# Patient Record
Sex: MALE | Race: WHITE | NOT HISPANIC OR LATINO | Employment: FULL TIME | URBAN - NONMETROPOLITAN AREA
[De-identification: names, ages, dates, MRNs, and addresses within clinical notes are randomized per-mention and may not be internally consistent; named-entity substitution may affect disease eponyms.]

---

## 2018-03-08 ENCOUNTER — TRANSCRIBE ORDERS (OUTPATIENT)
Dept: ADMINISTRATIVE | Facility: HOSPITAL | Age: 38
End: 2018-03-08

## 2020-12-01 ENCOUNTER — OFFICE VISIT (OUTPATIENT)
Dept: ENDOCRINOLOGY | Facility: CLINIC | Age: 40
End: 2020-12-01

## 2020-12-01 VITALS
SYSTOLIC BLOOD PRESSURE: 100 MMHG | HEART RATE: 64 BPM | WEIGHT: 282.4 LBS | DIASTOLIC BLOOD PRESSURE: 64 MMHG | BODY MASS INDEX: 38.25 KG/M2 | HEIGHT: 72 IN

## 2020-12-01 DIAGNOSIS — I10 BENIGN HYPERTENSION: ICD-10-CM

## 2020-12-01 DIAGNOSIS — E11.65 UNCONTROLLED TYPE 2 DIABETES MELLITUS WITH HYPERGLYCEMIA (HCC): Primary | ICD-10-CM

## 2020-12-01 DIAGNOSIS — E78.2 MIXED HYPERLIPIDEMIA: ICD-10-CM

## 2020-12-01 DIAGNOSIS — E11.42 TYPE 2 DIABETES MELLITUS WITH DIABETIC POLYNEUROPATHY, WITH LONG-TERM CURRENT USE OF INSULIN (HCC): ICD-10-CM

## 2020-12-01 DIAGNOSIS — Z79.4 TYPE 2 DIABETES MELLITUS WITH DIABETIC POLYNEUROPATHY, WITH LONG-TERM CURRENT USE OF INSULIN (HCC): ICD-10-CM

## 2020-12-01 PROBLEM — E11.49 TYPE 2 DIABETES MELLITUS WITH NEUROLOGIC COMPLICATION, WITH LONG-TERM CURRENT USE OF INSULIN (HCC): Status: ACTIVE | Noted: 2020-12-01

## 2020-12-01 LAB
EXPIRATION DATE: NORMAL
HBA1C MFR BLD: 6.7 %
Lab: NORMAL

## 2020-12-01 PROCEDURE — 99213 OFFICE O/P EST LOW 20 MIN: CPT | Performed by: INTERNAL MEDICINE

## 2020-12-01 PROCEDURE — 83036 HEMOGLOBIN GLYCOSYLATED A1C: CPT | Performed by: INTERNAL MEDICINE

## 2020-12-01 RX ORDER — INSULIN GLARGINE 300 U/ML
50 INJECTION, SOLUTION SUBCUTANEOUS DAILY
COMMUNITY
End: 2022-02-17 | Stop reason: SDUPTHER

## 2020-12-01 RX ORDER — DULAGLUTIDE 1.5 MG/.5ML
INJECTION, SOLUTION SUBCUTANEOUS WEEKLY
COMMUNITY
End: 2021-07-09 | Stop reason: DRUGHIGH

## 2020-12-01 RX ORDER — OMEPRAZOLE 20 MG/1
40 CAPSULE, DELAYED RELEASE ORAL DAILY
COMMUNITY

## 2020-12-01 RX ORDER — SIMVASTATIN 10 MG
20 TABLET ORAL NIGHTLY
COMMUNITY
End: 2022-07-19

## 2020-12-01 RX ORDER — ERGOCALCIFEROL 1.25 MG/1
50000 CAPSULE ORAL WEEKLY
COMMUNITY

## 2020-12-01 RX ORDER — LISINOPRIL 20 MG/1
40 TABLET ORAL DAILY
COMMUNITY

## 2020-12-01 RX ORDER — DAPAGLIFLOZIN AND METFORMIN HYDROCHLORIDE 5; 1000 MG/1; MG/1
1 TABLET, FILM COATED, EXTENDED RELEASE ORAL 2 TIMES DAILY
COMMUNITY
End: 2022-02-17 | Stop reason: SDUPTHER

## 2020-12-01 RX ORDER — DULOXETIN HYDROCHLORIDE 60 MG/1
60 CAPSULE, DELAYED RELEASE ORAL DAILY
COMMUNITY

## 2020-12-01 NOTE — PROGRESS NOTES
"     Office Note      Date: 2020  Patient Name: Inocencio Hicks  MRN: [id number]  : 1980    Chief Complaint   Patient presents with   • Diabetes       History of Present Illness:   Inocencio Hicks is a 40 y.o. male who presents for Diabetes type 2. Diagnosed in: . Treated in past with oral agents. Current treatments: farxiga, metformin, trulicity and basal insulin. Number of insulin shots per day: 1. Checks blood sugar none - on FreeStyle Gloria. Has low blood sugar: occasional. Aspirin use: No - no indication. Statin use: Yes. ACE-I/ARB use: Yes. Changes in health since last visit: none. Last eye exam .    Subjective      Diabetic Complications:  Eyes: No  Kidneys: No  Feet: Yes - on cymbalta  Heart: No    Diet and Exercise:  Meals per day: 3  Minutes of exercise per week: 0 mins.    Review of Systems:   Review of Systems   Constitutional: Negative.    Cardiovascular: Negative.    Gastrointestinal: Negative.    Endocrine: Negative.        The following portions of the patient's history were reviewed and updated as appropriate: allergies, current medications, past family history, past medical history, past social history, past surgical history and problem list.    Objective       Visit Vitals  /64 (BP Location: Right arm, Patient Position: Sitting, Cuff Size: Adult)   Pulse 64   Ht 182.9 cm (72\")   Wt 128 kg (282 lb 6.4 oz)   BMI 38.30 kg/m²       Physical Exam:  Physical Exam  Constitutional:       Appearance: Normal appearance.   Neurological:      Mental Status: He is alert.         Labs:    HbA1c  Lab Results   Component Value Date    HGBA1C 6.7 2020       CMP  No results found for: GLUCOSE, BUN, CREATININE, EGFRIFNONA, EGFRIFAFRI, BCR, K, CO2, CALCIUM, PROTENTOTREF, LABIL2, BILIRUBIN, AST, ALT     Lipid Panel        TSH  No results found for: TSH, FREET4     Hemoglobin A1C  Lab Results   Component Value Date    HGBA1C 6.7 2020        Microalbumin/Creatinine  No results " found for: MALBCRERATIO, CREATINIURIN, MICROALBUR        Assessment / Plan      Assessment & Plan:  Problem List Items Addressed This Visit        Cardiovascular and Mediastinum    Benign hypertension    Current Assessment & Plan     Hypertension is unchanged.  Continue current treatment regimen.  Blood pressure will be reassessed in 3 months.         Relevant Medications    lisinopril (PRINIVIL,ZESTRIL) 10 MG tablet    Mixed hyperlipidemia    Relevant Medications    simvastatin (ZOCOR) 10 MG tablet       Endocrine    Uncontrolled type 2 diabetes mellitus with hyperglycemia (CMS/[facility]) - Primary    Current Assessment & Plan     Diabetes is improving with treatment.   Continue current treatment regimen.  Diabetes will be reassessed in 3 months.         Relevant Medications    Insulin Glargine, 2 Unit Dial, (Toujeo Max SoloStar) 300 UNIT/ML solution pen-injector injection    Dulaglutide (Trulicity) 1.5 MG/0.5ML solution pen-injector    dapagliflozin-metformin HCl ER (Xigduo XR) 5-1000 MG tablet    Other Relevant Orders    POC Glycosylated Hemoglobin (Hb A1C) (Completed)    Type 2 diabetes mellitus with neurologic complication, with long-term current use of insulin (CMS/[facility])    Current Assessment & Plan     Continue duloxetine.         Relevant Medications    Insulin Glargine, 2 Unit Dial, (Toujeo Max SoloStar) 300 UNIT/ML solution pen-injector injection    Dulaglutide (Trulicity) 1.5 MG/0.5ML solution pen-injector    dapagliflozin-metformin HCl ER (Xigduo XR) 5-1000 MG tablet           Return in about 3 months (around 3/1/2021) for Recheck with A1c.    Brice Moura MD   12/01/2020

## 2020-12-02 ENCOUNTER — TELEPHONE (OUTPATIENT)
Dept: ENDOCRINOLOGY | Facility: CLINIC | Age: 40
End: 2020-12-02

## 2021-07-09 ENCOUNTER — OFFICE VISIT (OUTPATIENT)
Dept: ENDOCRINOLOGY | Facility: CLINIC | Age: 41
End: 2021-07-09

## 2021-07-09 VITALS
DIASTOLIC BLOOD PRESSURE: 60 MMHG | HEIGHT: 72 IN | BODY MASS INDEX: 38.82 KG/M2 | OXYGEN SATURATION: 95 % | SYSTOLIC BLOOD PRESSURE: 100 MMHG | HEART RATE: 71 BPM | WEIGHT: 286.6 LBS

## 2021-07-09 DIAGNOSIS — I10 BENIGN HYPERTENSION: ICD-10-CM

## 2021-07-09 DIAGNOSIS — E11.65 UNCONTROLLED TYPE 2 DIABETES MELLITUS WITH HYPERGLYCEMIA (HCC): Primary | ICD-10-CM

## 2021-07-09 DIAGNOSIS — E78.2 MIXED HYPERLIPIDEMIA: ICD-10-CM

## 2021-07-09 DIAGNOSIS — E11.42 TYPE 2 DIABETES MELLITUS WITH DIABETIC POLYNEUROPATHY, WITH LONG-TERM CURRENT USE OF INSULIN (HCC): ICD-10-CM

## 2021-07-09 DIAGNOSIS — Z79.4 TYPE 2 DIABETES MELLITUS WITH DIABETIC POLYNEUROPATHY, WITH LONG-TERM CURRENT USE OF INSULIN (HCC): ICD-10-CM

## 2021-07-09 LAB — HBA1C MFR BLD: 6.9 %

## 2021-07-09 PROCEDURE — 99214 OFFICE O/P EST MOD 30 MIN: CPT | Performed by: INTERNAL MEDICINE

## 2021-07-09 RX ORDER — SUMATRIPTAN 100 MG/1
100 TABLET, FILM COATED ORAL AS NEEDED
COMMUNITY

## 2021-07-09 RX ORDER — FEXOFENADINE HCL 180 MG/1
180 TABLET ORAL DAILY
COMMUNITY

## 2021-07-09 RX ORDER — CELECOXIB 200 MG/1
200 CAPSULE ORAL DAILY
COMMUNITY

## 2021-07-09 RX ORDER — DULAGLUTIDE 3 MG/.5ML
3 INJECTION, SOLUTION SUBCUTANEOUS
Qty: 6 ML | Refills: 3 | Status: SHIPPED | OUTPATIENT
Start: 2021-07-09 | End: 2022-02-17 | Stop reason: SDUPTHER

## 2021-07-09 RX ORDER — ALBUTEROL SULFATE 90 UG/1
2 AEROSOL, METERED RESPIRATORY (INHALATION) AS NEEDED
COMMUNITY

## 2021-07-09 NOTE — ASSESSMENT & PLAN NOTE
Diabetes is unchanged.   Continue current treatment regimen.  We discussed increase trulicity to try to help with weight.  Diabetes will be reassessed in 3 months.

## 2021-07-09 NOTE — PROGRESS NOTES
"     Office Note      Date: 2021  Patient Name: Inocencio Hicks  MRN: [id number]  : 1980    Chief Complaint   Patient presents with   • Follow-up   • Diabetes     type2   • Diabetic Eye Exam     2020       History of Present Illness:   Inocencio Hikcs is a 41 y.o. male who presents for Diabetes type 2. Diagnosed in: . Treated in past with oral agents. Current treatments: farxiga, metformin, trulicity and basal insulin. Number of insulin shots per day: 1. Checks blood sugar none - on FreeStyle Gloria. Has low blood sugar: occasional. Aspirin use: No - no indication. Statin use: Yes. ACE-I/ARB use: Yes. Changes in health since last visit: carpal tunnel and Dupuytren's contracture release. Last eye exam .    Subjective      Diabetic Complications:  Eyes: No  Kidneys: No  Feet: Yes - on cymbalta  Heart: No    Diet and Exercise:  Meals per day: 3  Minutes of exercise per week: 0 mins.    Review of Systems:   Review of Systems   Constitutional: Negative.    Cardiovascular: Negative.    Gastrointestinal: Negative.    Endocrine: Negative.        The following portions of the patient's history were reviewed and updated as appropriate: allergies, current medications, past family history, past medical history, past social history, past surgical history and problem list.    Objective       Visit Vitals  /60   Pulse 71   Ht 182.9 cm (72\")   Wt 130 kg (286 lb 9.6 oz)   SpO2 95%   BMI 38.87 kg/m²       Physical Exam:  Physical Exam  Constitutional:       Appearance: Normal appearance.   Neurological:      Mental Status: He is alert.         Labs:    HbA1c  Lab Results   Component Value Date    HGBA1C 6.9 2021       CMP  No results found for: GLUCOSE, BUN, CREATININE, EGFRIFNONA, EGFRIFAFRI, BCR, K, CO2, CALCIUM, PROTENTOTREF, LABIL2, BILIRUBIN, AST, ALT     Lipid Panel        TSH  No results found for: TSH, FREET4     Hemoglobin A1C  Lab Results   Component Value Date    HGBA1C 6.9 " 05/11/2021        Microalbumin/Creatinine  No results found for: MALBCRERATIO, CREATINIURIN, MICROALBUR        Assessment / Plan      Assessment & Plan:  Diagnoses and all orders for this visit:    1. Uncontrolled type 2 diabetes mellitus with hyperglycemia (CMS/[facility]) (Primary)  Assessment & Plan:  Diabetes is unchanged.   Continue current treatment regimen.  We discussed increase trulicity to try to help with weight.  Diabetes will be reassessed in 3 months.      2. Benign hypertension  Assessment & Plan:  Hypertension is unchanged.  Continue current treatment regimen.  Blood pressure will be reassessed at the next regular appointment.      3. Mixed hyperlipidemia  Assessment & Plan:  Continue statin.  Recent lipids at goal.      4. Type 2 diabetes mellitus with diabetic polyneuropathy, with long-term current use of insulin (CMS/[facility])  Assessment & Plan:  Continue cymbalta.      Other orders  -     Dulaglutide (Trulicity) 3 MG/0.5ML solution pen-injector; Inject 3 mg under the skin into the appropriate area as directed Every 7 (Seven) Days.  Dispense: 6 mL; Refill: 3      Return in about 3 months (around 10/9/2021) for Recheck with A1c, TSH.    Brice Moura MD   07/09/2021

## 2021-10-28 ENCOUNTER — OFFICE VISIT (OUTPATIENT)
Dept: ENDOCRINOLOGY | Facility: CLINIC | Age: 41
End: 2021-10-28

## 2021-10-28 VITALS
OXYGEN SATURATION: 95 % | BODY MASS INDEX: 37.93 KG/M2 | HEIGHT: 72 IN | DIASTOLIC BLOOD PRESSURE: 70 MMHG | HEART RATE: 78 BPM | SYSTOLIC BLOOD PRESSURE: 134 MMHG | WEIGHT: 280 LBS

## 2021-10-28 DIAGNOSIS — E78.2 MIXED HYPERLIPIDEMIA: ICD-10-CM

## 2021-10-28 DIAGNOSIS — E11.65 UNCONTROLLED TYPE 2 DIABETES MELLITUS WITH HYPERGLYCEMIA (HCC): Primary | ICD-10-CM

## 2021-10-28 DIAGNOSIS — I10 BENIGN HYPERTENSION: ICD-10-CM

## 2021-10-28 DIAGNOSIS — E11.65 UNCONTROLLED TYPE 2 DIABETES MELLITUS WITH HYPERGLYCEMIA (HCC): ICD-10-CM

## 2021-10-28 LAB
EXPIRATION DATE: NORMAL
EXPIRATION DATE: NORMAL
GLUCOSE BLDC GLUCOMTR-MCNC: 121 MG/DL (ref 70–130)
HBA1C MFR BLD: 6.2 %
Lab: NORMAL
Lab: NORMAL
TSH SERPL DL<=0.05 MIU/L-ACNC: 1.18 UIU/ML (ref 0.27–4.2)

## 2021-10-28 PROCEDURE — 95251 CONT GLUC MNTR ANALYSIS I&R: CPT | Performed by: INTERNAL MEDICINE

## 2021-10-28 PROCEDURE — 83036 HEMOGLOBIN GLYCOSYLATED A1C: CPT | Performed by: INTERNAL MEDICINE

## 2021-10-28 PROCEDURE — 84443 ASSAY THYROID STIM HORMONE: CPT | Performed by: INTERNAL MEDICINE

## 2021-10-28 PROCEDURE — 99214 OFFICE O/P EST MOD 30 MIN: CPT | Performed by: INTERNAL MEDICINE

## 2021-10-28 NOTE — PROGRESS NOTES
"     Office Note      Date: 10/28/2021  Patient Name: Inocencio Hicks  MRN: [id number]  : 1980    Chief Complaint   Patient presents with   • Diabetes       History of Present Illness:   Inocencio Hicks is a 41 y.o. male who presents for Diabetes type 2. Diagnosed in: . Treated in past with oral agents. Current treatments: farxiga, metformin, trulicity and basal insulin. Number of insulin shots per day: 1. Checks blood sugar 288x per day - on FreeStyle Gloria. Has low blood sugar: occasional. Aspirin use: No - no indication. Statin use: Yes. ACE-I/ARB use: Yes. Changes in health since last visit: COVID-19 positive and sinus infection. Last eye exam .    Subjective      Diabetic Complications:  Eyes: No  Kidneys: No  Feet: Yes - on cymbalta  Heart: No    Diet and Exercise:  Meals per day: 3  Minutes of exercise per week: 0 mins.    Review of Systems:   Review of Systems   Constitutional: Negative.    Cardiovascular: Negative.    Gastrointestinal: Negative.    Endocrine: Negative.        The following portions of the patient's history were reviewed and updated as appropriate: allergies, current medications, past family history, past medical history, past social history, past surgical history and problem list.    Objective       Visit Vitals  /70   Pulse 78   Ht 182.9 cm (72\")   Wt 127 kg (280 lb)   SpO2 95%   BMI 37.97 kg/m²       Physical Exam:  Physical Exam  Constitutional:       Appearance: Normal appearance.   Cardiovascular:      Pulses:           Dorsalis pedis pulses are 2+ on the right side and 2+ on the left side.        Posterior tibial pulses are 2+ on the right side and 2+ on the left side.   Feet:      Right foot:      Protective Sensation: 5 sites tested. 5 sites sensed.      Skin integrity: Skin integrity normal.      Toenail Condition: Right toenails are abnormally thick.      Left foot:      Protective Sensation: 5 sites tested. 5 sites sensed.      Skin integrity: Skin " integrity normal.      Toenail Condition: Left toenails are abnormally thick.   Neurological:      Mental Status: He is alert.         Labs:    HbA1c  Lab Results   Component Value Date    HGBA1C 6.2 10/28/2021       CMP  No results found for: GLUCOSE, BUN, CREATININE, EGFRIFNONA, EGFRIFAFRI, BCR, K, CO2, CALCIUM, PROTENTOTREF, LABIL2, BILIRUBIN, AST, ALT     Lipid Panel        TSH  No results found for: TSH, FREET4     Hemoglobin A1C  Lab Results   Component Value Date    HGBA1C 6.2 10/28/2021        Microalbumin/Creatinine  No results found for: MALBCRERATIO, CREATINIURIN, MICROALBUR        Assessment / Plan      Assessment & Plan:  Diagnoses and all orders for this visit:    1. Uncontrolled type 2 diabetes mellitus with hyperglycemia (HCC) (Primary)  Assessment & Plan:  Diabetes is improving with treatment.  A1c looks good at 6.2%.  Continue current treatment regimen.  Diabetes will be reassessed in 3 months.    FreeStyle Gloria download today shows no patterns for medication adjustments.  Worked on setting this up for sharing today.    Orders:  -     POC Glycosylated Hemoglobin (Hb A1C)  -     POC Glucose, Blood  -     TSH; Future    2. Benign hypertension  Assessment & Plan:  Hypertension is unchanged.  Continue current treatment regimen.  Blood pressure will be reassessed at the next regular appointment.      3. Mixed hyperlipidemia  Assessment & Plan:  Continue statin.        Return in about 3 months (around 1/28/2022) for Recheck with A1c.    Brice Moura MD   10/28/2021

## 2021-10-28 NOTE — ASSESSMENT & PLAN NOTE
Diabetes is improving with treatment.  A1c looks good at 6.2%.  Continue current treatment regimen.  Diabetes will be reassessed in 3 months.    FreeStyle Gloria download today shows no patterns for medication adjustments.  Worked on setting this up for sharing today.

## 2022-02-10 ENCOUNTER — DOCUMENTATION (OUTPATIENT)
Dept: ENDOCRINOLOGY | Facility: CLINIC | Age: 42
End: 2022-02-10

## 2022-02-17 ENCOUNTER — SPECIALTY PHARMACY (OUTPATIENT)
Dept: ENDOCRINOLOGY | Facility: CLINIC | Age: 42
End: 2022-02-17

## 2022-02-17 ENCOUNTER — OFFICE VISIT (OUTPATIENT)
Dept: ENDOCRINOLOGY | Facility: CLINIC | Age: 42
End: 2022-02-17

## 2022-02-17 VITALS
DIASTOLIC BLOOD PRESSURE: 80 MMHG | SYSTOLIC BLOOD PRESSURE: 130 MMHG | HEIGHT: 72 IN | BODY MASS INDEX: 38.6 KG/M2 | OXYGEN SATURATION: 96 % | WEIGHT: 285 LBS | HEART RATE: 76 BPM

## 2022-02-17 DIAGNOSIS — Z79.4 TYPE 2 DIABETES MELLITUS WITH DIABETIC POLYNEUROPATHY, WITH LONG-TERM CURRENT USE OF INSULIN: ICD-10-CM

## 2022-02-17 DIAGNOSIS — I10 BENIGN HYPERTENSION: ICD-10-CM

## 2022-02-17 DIAGNOSIS — E11.65 UNCONTROLLED TYPE 2 DIABETES MELLITUS WITH HYPERGLYCEMIA: Primary | ICD-10-CM

## 2022-02-17 DIAGNOSIS — E11.42 TYPE 2 DIABETES MELLITUS WITH DIABETIC POLYNEUROPATHY, WITH LONG-TERM CURRENT USE OF INSULIN: ICD-10-CM

## 2022-02-17 DIAGNOSIS — E78.2 MIXED HYPERLIPIDEMIA: ICD-10-CM

## 2022-02-17 LAB
EXPIRATION DATE: ABNORMAL
EXPIRATION DATE: ABNORMAL
GLUCOSE BLDC GLUCOMTR-MCNC: 148 MG/DL (ref 70–130)
HBA1C MFR BLD: 6.4 %
Lab: ABNORMAL
Lab: ABNORMAL

## 2022-02-17 PROCEDURE — 83036 HEMOGLOBIN GLYCOSYLATED A1C: CPT | Performed by: INTERNAL MEDICINE

## 2022-02-17 PROCEDURE — 95251 CONT GLUC MNTR ANALYSIS I&R: CPT | Performed by: INTERNAL MEDICINE

## 2022-02-17 PROCEDURE — 99214 OFFICE O/P EST MOD 30 MIN: CPT | Performed by: INTERNAL MEDICINE

## 2022-02-17 RX ORDER — FLURBIPROFEN SODIUM 0.3 MG/ML
SOLUTION/ DROPS OPHTHALMIC
Qty: 100 EACH | Refills: 3 | Status: SHIPPED | OUTPATIENT
Start: 2022-02-17

## 2022-02-17 RX ORDER — DAPAGLIFLOZIN AND METFORMIN HYDROCHLORIDE 5; 1000 MG/1; MG/1
2 TABLET, FILM COATED, EXTENDED RELEASE ORAL DAILY
Qty: 60 TABLET | Refills: 11 | Status: SHIPPED | OUTPATIENT
Start: 2022-02-17 | End: 2023-01-04 | Stop reason: SDUPTHER

## 2022-02-17 RX ORDER — FLASH GLUCOSE SENSOR
1 KIT MISCELLANEOUS
Qty: 2 EACH | Refills: 11 | Status: SHIPPED | OUTPATIENT
Start: 2022-02-17 | End: 2022-09-13

## 2022-02-17 RX ORDER — FLASH GLUCOSE SENSOR
KIT MISCELLANEOUS
COMMUNITY
Start: 2021-12-23 | End: 2022-02-17 | Stop reason: SDUPTHER

## 2022-02-17 RX ORDER — DULAGLUTIDE 3 MG/.5ML
3 INJECTION, SOLUTION SUBCUTANEOUS
Qty: 2 ML | Refills: 11 | Status: SHIPPED | OUTPATIENT
Start: 2022-02-17 | End: 2023-01-04 | Stop reason: SDUPTHER

## 2022-02-17 RX ORDER — DAPAGLIFLOZIN AND METFORMIN HYDROCHLORIDE 5; 1000 MG/1; MG/1
2 TABLET, FILM COATED, EXTENDED RELEASE ORAL DAILY
Qty: 30 TABLET | Refills: 11 | Status: SHIPPED | OUTPATIENT
Start: 2022-02-17 | End: 2022-02-17 | Stop reason: SDUPTHER

## 2022-02-17 RX ORDER — INSULIN GLARGINE 300 U/ML
50 INJECTION, SOLUTION SUBCUTANEOUS DAILY
Qty: 6 ML | Refills: 11 | Status: SHIPPED | OUTPATIENT
Start: 2022-02-17 | End: 2022-09-13 | Stop reason: SDUPTHER

## 2022-02-17 NOTE — ASSESSMENT & PLAN NOTE
Diabetes is unchanged.  A1c okay at 6.4%.  CGM shows some spikes after supper.  He hasn't been as good about diet.    Continue current treatment regimen.  Work on diet/exercise.  Diabetes will be reassessed in 3 months.

## 2022-02-17 NOTE — PROGRESS NOTES
"     Office Note      Date: 2022  Patient Name: Inocencio Hicks  MRN: [id number]  : 1980    Chief Complaint   Patient presents with   • Diabetes       History of Present Illness:   Inocencio Hicks is a 42 y.o. male who presents for Diabetes type 2. Diagnosed in: . Treated in past with oral agents. Current treatments: farxiga, metformin, trulicity and basal insulin. Number of insulin shots per day: 1. Checks blood sugar 288x per day - on FreeStyle Gloria. Has low blood sugar: occasional. Aspirin use: No - no indication. Statin use: Yes. ACE-I/ARB use: Yes. Changes in health since last visit: none. Last eye exam .    Subjective      Diabetic Complications:  Eyes: No  Kidneys: No  Feet: Yes - on cymbalta  Heart: No    Diet and Exercise:  Meals per day: 3  Minutes of exercise per week: 0 mins.    Review of Systems:   Review of Systems   Constitutional: Negative.    Cardiovascular: Negative.    Gastrointestinal: Negative.    Endocrine: Negative.        The following portions of the patient's history were reviewed and updated as appropriate: allergies, current medications, past family history, past medical history, past social history, past surgical history and problem list.    Objective       Visit Vitals  /80   Pulse 76   Ht 182.9 cm (72\")   Wt 129 kg (285 lb)   SpO2 96%   BMI 38.65 kg/m²       Physical Exam:  Physical Exam  Constitutional:       Appearance: Normal appearance.   Neurological:      Mental Status: He is alert.         Labs:    HbA1c  Lab Results   Component Value Date    HGBA1C 6.4 2022       CMP  No results found for: GLUCOSE, BUN, CREATININE, EGFRIFNONA, EGFRIFAFRI, BCR, K, CO2, CALCIUM, PROTENTOTREF, LABIL2, BILIRUBIN, AST, ALT     Lipid Panel        TSH  Lab Results   Component Value Date    TSH 1.180 10/28/2021        Hemoglobin A1C  Lab Results   Component Value Date    HGBA1C 6.4 2022        Microalbumin/Creatinine  No results found for: AVNI" CREATINIURIN, MICROALBUR        Assessment / Plan      Assessment & Plan:  Diagnoses and all orders for this visit:    1. Uncontrolled type 2 diabetes mellitus with hyperglycemia (HCC) (Primary)  Assessment & Plan:  Diabetes is unchanged.  A1c okay at 6.4%.  CGM shows some spikes after supper.  He hasn't been as good about diet.    Continue current treatment regimen.  Work on diet/exercise.  Diabetes will be reassessed in 3 months.    Orders:  -     POC Glycosylated Hemoglobin (Hb A1C)  -     POC Glucose, Blood    2. Type 2 diabetes mellitus with diabetic polyneuropathy, with long-term current use of insulin (HCC)  Assessment & Plan:  Continue duloxetine.      3. Benign hypertension  Assessment & Plan:  Hypertension is unchanged.  Continue current treatment regimen.  Blood pressure will be reassessed at the next regular appointment.      4. Mixed hyperlipidemia  Assessment & Plan:  Continue statin.        Return in about 3 months (around 5/17/2022) for Recheck with A1c, CMP, lipids, TSH, microalbumin, foot exam.    Brice Moura MD   02/17/2022

## 2022-02-17 NOTE — PROGRESS NOTES
Specialty Pharmacy Patient Management Program  Endocrinology Initial Assessment     Inocencio Hicks is a 42 y.o. male with Type 2 Diabetes seen by an Endocrinology provider and enrolled in the Endocrinology Patient Management program offered by [facility].  An initial outreach was conducted, including assessment of therapy appropriateness and specialty medication education for TRULICITY, XIGDUO, TOUJEO MAX. The patient was introduced to services offered by [facility], including: regular assessments, refill coordination, curbside pick-up or mail order delivery options, prior authorization maintenance, and financial assistance programs as applicable. The patient was also provided with contact information for the pharmacy team.     Insurance Coverage & Financial Support  [employer] PPO +  Coupons    Relevant Past Medical History and Comorbidities  Relevant medical history and concomitant health conditions were discussed with the patient. The patient's chart has been reviewed for relevant past medical history and comorbid health conditions and updated as necessary.   Past Medical History:   Diagnosis Date   • Diabetes mellitus (HCC)    • Hyperlipidemia    • Hypertension    • Type 2 diabetes mellitus (HCC)      Social History     Socioeconomic History   • Marital status:    Tobacco Use   • Smoking status: Former Smoker     Quit date: 2010     Years since quittin.2   • Smokeless tobacco: Never Used   Vaping Use   • Vaping Use: Never used   Substance and Sexual Activity   • Alcohol use: Yes     Comment: rare   • Drug use: Not Currently   • Sexual activity: Defer       Problem list reviewed by Yen Henderson RPH on 2022 at  2:31 PM    Allergies  Known allergies and reactions were discussed with the patient. The patient's chart has been reviewed for  allergy information and updated as necessary.   Byetta 10 mcg pen [exenatide] and  Victoza [liraglutide]    Allergies reviewed by Yen Henderson RPH on 2/17/2022 at  2:29 PM  Allergies reviewed by Yen Henderson RPH on 2/17/2022 at  2:30 PM    Current Medication List  This medication list has been reviewed with the patient and evaluated for any interactions or necessary modifications/recommendations, and updated to include all prescription medications, OTC medications, and supplements the patient is currently taking.  This list reflects what is contained in the patient's profile, which has also been marked as reviewed to communicate to other providers it is the most up to date version of the patient's current medication therapy.     Current Outpatient Medications:   •  albuterol sulfate  (90 Base) MCG/ACT inhaler, Inhale 2 puffs As Needed., Disp: , Rfl:   •  celecoxib (CeleBREX) 200 MG capsule, Take 200 mg by mouth Daily., Disp: , Rfl:   •  Continuous Blood Gluc Sensor (FreeStyle Gloria 14 Day Sensor) misc, Change sensor every 14 days, Disp: , Rfl:   •  dapagliflozin-metformin HCl ER (Xigduo XR) 5-1000 MG tablet, Take 1 tablet by mouth 2 (two) times a day., Disp: , Rfl:   •  Dulaglutide (Trulicity) 3 MG/0.5ML solution pen-injector, Inject 3 mg under the skin into the appropriate area as directed Every 7 (Seven) Days., Disp: 6 mL, Rfl: 3  •  DULoxetine (CYMBALTA) 60 MG capsule, Take 60 mg by mouth Daily., Disp: , Rfl:   •  fexofenadine (ALLEGRA) 180 MG tablet, Take 180 mg by mouth Daily., Disp: , Rfl:   •  Insulin Glargine, 2 Unit Dial, (Toujeo Max SoloStar) 300 UNIT/ML solution pen-injector injection, Inject 50 Units under the skin into the appropriate area as directed Daily., Disp: , Rfl:   •  lisinopril (PRINIVIL,ZESTRIL) 10 MG tablet, Take 20 mg by mouth 2 (two) times a day., Disp: , Rfl:   •  omeprazole (priLOSEC) 20 MG capsule, Take 40 mg by mouth Daily., Disp: , Rfl:   •  simvastatin (ZOCOR) 10 MG tablet, Take 20 mg by mouth Every Night., Disp: , Rfl:   •  SUMAtriptan (IMITREX) 100  MG tablet, Take 100 mg by mouth As Needed., Disp: , Rfl:   •  vitamin D (ERGOCALCIFEROL) 1.25 MG (82762 UT) capsule capsule, Take 50,000 Units by mouth 1 (One) Time Per Week., Disp: , Rfl:     Medicines reviewed by Yen Henderson RPH on 2/17/2022 at  2:31 PM    Drug Interactions  No significant DDI identified per medication review     Recommended Medications Assessment  • Aspirin - Not Indicated  • Statin - Currently Taking  • ACEi/ARB - Currently Taking    Relevant Laboratory Values  A1C Last 3 Results    HGBA1C Last 3 Results 5/11/21 10/28/21 2/17/22   Hemoglobin A1C 6.9 6.2 6.4           Lab Results   Component Value Date    HGBA1C 6.4 02/17/2022     No results found for: GLUCOSE, CALCIUM, NA, K, CO2, CL, BUN, CREATININE, EGFRIFAFRI, EGFRIFNONA, BCR, ANIONGAP  No results found for: CHOL, CHLPL, TRIG, HDL, LDL, LDLDIRECT      Initial Education Provided for Specialty Medication  The patient has been provided with the following education and any applicable administration techniques (i.e. self-injection) have been demonstrated for the therapies indicated. All questions and concerns have been addressed prior to the patient receiving the medication, and the patient has verbalized understanding of the education and any materials provided.  Additional patient education shall be provided and documented upon request by the patient, provider or payer.      XIGDUO® XR (dapagliflozin + metformin)   Medication Expectations   Why am I taking this medication? You are taking Xigduo to lower blood sugar because you have type 2 diabetes. Diabetes is not curable but with proper medication and treatment, we can keep your blood sugar within your personalized target range. Farxiga (dapagliflozin), one of the medications in Xigduo, can reduce the risk of progression of kidney disease, reduce the risk of death from heart attack or stroke, and reduce the risk of heart failure hospitalization.    What should I expect while on this  medication? You should expect to see your blood sugar and A1c decrease over time. You may also see a decrease in your blood pressure and it can help some people lose weight.     How does the medication work? Xigduo works by removing some sugar that the body doesn't need through urination, lowering sugar production in the body, reducing the amount of sugar that enters the bloodstream after a meal, and making your body more sensitive to insulin.     How long will I be on this medication for? The amount of time you will be on this medication will be determined by your doctor based on blood sugar and A1c control. You will most likely be on this medication or another diabetes medication throughout your lifetime. Do not abruptly stop this medication without talking to your doctor first.    How do I take this medication? Take as directed on your prescription label, usually once daily in the morning. Swallow tablets whole, do not crush/cut/chew. Take with food.     What are some possible side effects? The most common side effects include urinary tract infections, genital yeast infections, increased urination, diarrhea, gas, nausea and vomiting, stomach pain, indigestion, headache, and stuffy or runny nose and sore throat. Talk with your doctor if you notice white or yellow vaginal discharge, vaginal itching or odor of if you notice redness, itching, pain, or swelling of the penis and/or bad-smelling discharge from the penis.   What happens if I miss a dose? If you miss a dose, take it as soon as you remember. If it is close to your next dose, skip it (do not take 2 doses at once)     Medication Safety   What are things I should warn my doctor immediately about? Tell your doctor if you have kidney disease, liver disease, heart failure, pancreas problems, vitamin B-12 deficiency, or history of frequent genital yeast or urinary tract infections. Tell your doctor if you are on a low-salt diet, if you drink alcohol, or if you  are having surgery. Talk to your doctor if you are pregnant, planning to become pregnant, or breastfeeding. If you have irregular periods or none at all but have not gone through menopause, this medication can cause ovulation and increase the chance of getting pregnant. Also tell your doctor if you notice any signs/symptoms of an allergic reaction (rash, hives, difficulty breathing, etc.).   What are things that I should be cautious of? Be cautious of any side effects from this medication. Talk to your doctor if any new ones develop or aren't getting better.   What are some medications that can interact with this one? This medication can interact with the dye used for an x-ray or CT-scan. Some medications that interact include ranolazine, cimetidine, diuretics (water pills), and other medications that may also lower your blood sugar such as insulins and glipizide/glimepiride/glyburide. Your doctor may reduce the dose of other diabetes medications when you start Xigduo to minimize low blood sugars. Always tell your doctor or pharmacist immediately if you start taking any new medications, including over-the-counter medications, vitamins, and herbal supplements.     Medication Storage/Handling   How should I handle this medication? Keep this medication out of reach of pets/children in tightly sealed container   How does this medication need to be stored? Store at room temperature and keep dry (don't keep in bathroom or other room with moisture)   How should I dispose of this medication? There should not be a need to dispose of this medication unless your provider decides to change the dose or therapy. If that is the case, take to your local police station for proper disposal. Some pharmacies also have take-back bins for medication drop-off.      Resources/Support   How can I remind myself to take this medication? You can download reminder apps to help you manage your refills. You may also set an alarm on your phone to  remind you. The pharmacy carries pill boxes that you can place next to an area you pass everyday (such as where you place your car keys or where you charge your phone)   Is financial support available?  [employer] can provide co-pay cards if you have commercial insurance or patient assistance if you have Medicare or no insurance.    Which vaccines are recommended for me? Talk to your doctor about these vaccines: Flu, Coronavirus (COVID-19), Pneumococcal (pneumonia), Tdap, Hepatitis B, Zoster (shingles)        TRULICITY® (dulaglutide)  Medication Expectations   Why am I taking this medication? You are taking Trulicity, along with diet and exercise, to lower blood sugar because you have type 2 diabetes. Diabetes is not curable but with proper medication and treatment, you can keep your blood sugar within your personalized target range. This medication may also help you lose some weight, and it helps reduce the risk of death from heart attack, and stroke in adults with type 2 diabetes and known heart disease.   What should I expect while on this medication? You should expect to see your blood sugar and A1c decrease over time and you may also lose some weight.   How does the medication work? Trulicity is a non-insulin injection that works with your body's own ability to lower blood sugar and A1c and helps your body release its own insulin in response to your blood sugar rising.  This medication also slows down food from leaving your stomach, making you feel quintero for longer.   How long will I be on this medication for? The amount of time you will be on this medication will be determined by your doctor based on blood sugar and A1c control. You will most likely be on this medication or another diabetes medication throughout your lifetime. Do not abruptly stop this medication without talking to your doctor first.    How do I take this medication? Take as directed on your prescription label. Trulicity is supplied in a  single-use pen for each dose and you will use a new pre-filled pen each week.  It is injected under the skin (subcutaneously) of your stomach, thigh or upper arm.  You may inject in the same body area each week, but make sure to use a different spot each time.  Use this medication once weekly, on the same day each week, with or without food.      First, choose your injection site and clean the area, allowing it to dry completely.  Make sure the pen is in the locked position and remove the cap by pulling it straight off.    • Turn the pen to the unlocked position and place the clear base firmly against your skin at your injection site.  Press and hold the green button; you will hear a click once the injection starts.    • You will hear a second click when the needle starts retracting.  The injection will take about 5-10 seconds.    • Continue pressing against your skin until the gray plunger is visible, and then you will slowly lift the pen from your skin and dispose of the pen (detailed below in “Medication Storage / Handling”).    What are some possible side effects? You may notice you don't feel as hungry, especially when you first start using Trulicity.  In addition to decreased appetite, the most common side effects are nausea, diarrhea, vomiting, stomach pain, indigestion, and fatigue.  Redness, itching, and/or swelling can occur where the shot was given. You should also monitor for low blood sugar (hypoglycemia), especially if you are taking Trulicity with other medications that cause low blood sugar.    What happens if I miss a dose? If you miss a dose, take it as soon as you remember as long as there are at least 3 days until the next scheduled dose.  If there are less than 3 days, skip the missed dose and resume Trulicity on the regularly scheduled day.  Do not take 2 doses at the same time or extra doses.     Medication Safety   What are things I should warn my doctor immediately about? Do not use  Trulicity if you or a family member have ever had medullary thyroid cancer (MTC) or Multiple Endocrine Neoplasia syndrome type 2 (MEN 2).    • Tell your doctor if you get a lump or swelling in your neck, hoarseness, difficulty swallowing, or feel short of breath (these may be symptoms of thyroid cancer).    Tell your doctor if you have or have had problems with your kidneys or pancreas.   • Stop using Trulicity and get medical help right away if you have severe pain in your stomach area that will not go away as this could be a sign of pancreatitis (inflammation of your pancreas)    Tell your doctor if you have problem digesting food or slowed emptying of your stomach (gastroparesis).      Let your doctor know if you have changes in vision while taking Trulicity or have been diagnosed with diabetic retinopathy.    Talk to your doctor if you are pregnant, planning to become pregnant, or breastfeeding.     Get medical help right away if you notice any signs/symptoms of an allergic reaction (rash, hives, difficulty breathing, etc.).   What are things that I should be cautious of? Be cautious of any side effects from this medication. Talk to your doctor if any new ones develop or aren't getting better.   What are some medications that can interact with this one? Taking Trulicity with other medications that also lower your blood sugar such as insulin and glipizide/glimepiride/glyburide may increase the risk of low blood sugar.  • Your doctor may reduce the dose of these medications when you start Trulicity to minimize low blood sugars    It should not be taken with other medicines called GLP-1 receptor agonists, because these work the same way as Trulicity.      Because Trulicity slows stomach emptying, it can affect the way some medicines work.    Always tell your doctor or pharmacist immediately if you start taking any new medications, including over-the-counter medications, vitamins, and herbal supplements.       Medication Storage/Handling   How should I handle this medication? Keep this medication out of reach of pets/children and keep the pen capped when not in use.  Do not share your medicine pens with others.   How does this medication need to be stored? Store Trulicity in the refrigerator [2°C to 8°C (36°F to 46°F)]; do not freeze and do not use if Trulicity has been frozen.  You may store your Trulicity pens at room temperature [8°C to 30°C (46°F to 86°F)] for up to 14 days.  Protect from excessive heat and sunlight.  Keep in the original carton until time of administration.   How should I dispose of this medication? Used Trulicity pens should discarded after each use (for single use only). Place your used Trulicity pen in an approved sharps container after use.  If you do not have a sharps container, you may use a household container made of heavy-duty plastic with a tight-fitting lid that is leak resistant (e.g., heavy-duty plastic laundry detergent bottle).      If your doctor decides to stop this medication, take to your local police station for proper disposal. Some pharmacies also have take-back bins for medication drop-off.     Resources/Support   How can I remind myself to take this medication? You can download reminder apps to help you manage your refills. You may also set an alarm on your phone to remind you.    Is financial support available?  [employer] can provide co-pay cards if you have commercial insurance or patient assistance if you have Medicare or no insurance.    Which vaccines are recommended for me? Talk to your doctor about these vaccines:  • Flu   • Coronavirus (COVID-19)   • Pneumococcal (pneumonia)   • Tdap   • Hepatitis B   • Zoster (shingles)        Adherence and Self-Administration  • Barriers to Patient Adherence and/or Self-Administration: None   • Methods for Supporting Patient Adherence and/or Self-Administration: None     Goals of Therapy  Goals     •  Specialty Pharmacy General Goal  (pt-stated)       Pt stated: Take medication daily as prescribed       •  Specialty Pharmacy General Goal       Clinical Target: Maintain A1c 6-7%           Reassessment Plan & Follow-Up  1. Medication Therapy Changes: None   2. Additional Plans, Therapy Recommendations, or Therapy Problems to Be Addressed: None   3. Pharmacist to perform regular reassessments no more than (6) months from the previous assessment.  4. Welcome information and patient satisfaction survey to be sent by retail team with patient's initial fill.  5. Care Coordinator to set up future refill outreaches, coordinate prescription delivery, and escalate clinical questions to pharmacist. Pt would like to align refills and ship together if possible in future.   6. Specialty Pharmacy Delivery Coordination:    Delivery Questions      Most Recent Value   Delivery method FedEx   Delivery address correct? Yes   Preferred delivery time? Anytime   Number of medications in delivery 2   Medication being filled and delivered Toujeo, [place]   Doses left of specialty medications A couple, sample provided on 2/17   Is there any medication that is due not being filled? No   Cooler needed? Yes   Do any medications need mixed or dated? No   Copay form of payment Credit card on file   Additional comments $0   Questions or concerns for the pharmacist? No   Are any medications first time fills? Yes            Attestation  I attest that the initiated specialty medication(s) are appropriate for the patient based on my assessment.  If the prescribed therapy is at any point deemed not appropriate based on the current or future assessments, a consultation will be initiated with the patient's specialty care provider to determine the best course of action. The revised plan of therapy will be documented along with any additional patient education provided.     Yen Henderson, MontanaD, BCACP   2/17/2022  14:34 EST

## 2022-03-01 ENCOUNTER — SPECIALTY PHARMACY (OUTPATIENT)
Dept: ENDOCRINOLOGY | Facility: CLINIC | Age: 42
End: 2022-03-01

## 2022-03-01 NOTE — PROGRESS NOTES
Specialty Pharmacy Refill Coordination Note     Inocencio is a 42 y.o. male contacted today regarding refills of  Trulicity and Freestyle Gloria specialty medication(s).    Reviewed and verified with patient:       Specialty medication(s) and dose(s) confirmed: yes    Refill Questions      Most Recent Value   Changes to allergies? No   Changes to medications? No   New conditions since last clinic visit No   Unplanned office visit, urgent care, ED, or hospital admission in the last 4 weeks  No   How does patient/caregiver feel medication is working? Good   Financial problems or insurance changes  No   How many doses of your specialty medications were missed in the last 4 weeks? None-takes doses on Sunday   Does this patient require a clinical escalation to a pharmacist? No          Delivery Questions      Most Recent Value   Delivery method FedEx   Delivery address correct? Yes   Preferred delivery time? Anytime   Number of medications in delivery 2   Medication being filled and delivered Trulicity and Freestyle Gloria   Doses left of specialty medications 0   Is there any medication that is due not being filled? No   Copay form of payment Credit card on file   Questions or concerns for the pharmacist? No                 Follow-up: 28 day(s)     Lani Dodd CPhT  Pharmacy Care Coordinator  3/1/2022  11:10 EST

## 2022-03-15 ENCOUNTER — SPECIALTY PHARMACY (OUTPATIENT)
Dept: ENDOCRINOLOGY | Facility: CLINIC | Age: 42
End: 2022-03-15

## 2022-03-15 NOTE — PROGRESS NOTES
Specialty Pharmacy Patient Management Program  Endocrinology Refill Outreach      Inocencio is a 42 y.o. male contacted today regarding refills of his specialty medication(s). Spoke to patient's wife, Loida, today. Patient provided permission to speak with his wife regarding medication refills.     Specialty medication(s) and dose(s) confirmed: Xigduo XR, Toujeo Max    Refill Questions    Flowsheet Row Most Recent Value   Changes to allergies? No   Changes to medications? No   New conditions since last clinic visit No   Unplanned office visit, urgent care, ED, or hospital admission in the last 4 weeks  No   How does patient/caregiver feel medication is working? Good   Financial problems or insurance changes  No   If yes, describe changes in insurance or financial issues. N/A   How many doses of your specialty medications were missed in the last 4 weeks? Unknown, spoke with pt's wife   Why were doses missed? N/A   Does this patient require a clinical escalation to a pharmacist? No          Delivery Questions    Flowsheet Row Most Recent Value   Delivery method FedEx   Delivery address correct? Yes   Preferred delivery time? Anytime   Number of medications in delivery 2   Medication being filled and delivered Toujeo, Xigduo   Doses left of specialty medications ~1 week   Is there any medication that is due not being filled? No   Supplies needed? No supplies needed   Cooler needed? Yes   Copay form of payment Credit card on file   Additional comments $0 copay   Questions or concerns for the pharmacist? No   Are any medications first time fills? Yes            Follow-up: ~1 month     Yen Henderson, PharmD, BCACP  Specialty Clinical Pharmacist  3/15/2022  09:59 EDT

## 2022-03-28 ENCOUNTER — SPECIALTY PHARMACY (OUTPATIENT)
Dept: ENDOCRINOLOGY | Facility: CLINIC | Age: 42
End: 2022-03-28

## 2022-03-28 NOTE — PROGRESS NOTES
Specialty Pharmacy Refill Coordination Note     Inocencio is a 42 y.o. male contacted today regarding refills of  Freestyle Gloria and Trulicity specialty medication(s).    Reviewed and verified with patient:         Specialty medication(s) and dose(s) confirmed: yes    Refill Questions    Flowsheet Row Most Recent Value   Does this patient require a clinical escalation to a pharmacist? No          Delivery Questions    Flowsheet Row Most Recent Value   Delivery method FedEx   Delivery address correct? Yes   Preferred delivery time? Anytime   Number of medications in delivery 2   Medication being filled and delivered Trulicity and Freestyle Gloria   Copay form of payment Credit card on file   Questions or concerns for the pharmacist? No        Spoke to patient's wife Loida. She said  set all this up, if it's due fill it.           Follow-up: 30 day(s)     Lani Dodd CPhT  Pharmacy Care Coordinator  3/28/2022  11:27 EDT

## 2022-03-28 NOTE — PROGRESS NOTES
Care Coordinator spoke to patient's wife, Loida. Wife was unable to answer refill questions at today's refill f/u but requested medications be filled if they are due. Will ship Trulicity and Freestyle Gloria 2 on 3/28 for delivery 3/29/22.     Yen Henderson, PharmD, BCACP  Specialty Clinical Pharmacist  3/28/2022  13:42 EDT

## 2022-03-31 NOTE — PROGRESS NOTES
Upon reviewing patient's medication delivery tracking, appears medication packages have been delayed. Trulicity and Freestyle Sensors where not shipped until 3/29 (priority overnight). However, have still not been delivered. Ancipitated delivery is now today. Made patient aware, confirms he has not yet received packages. States he is out of Trulicity but next dose not due until Sunday, 4/3. Also states he has ~1.5 weeks left on current Freestly Gloria sensor. Advised patient to call writer back tomorrow to confirm medication received and Trulicity remains at cool temperature. If not, will ship replacement trulicity free of charge. Advised pens are good at room temperature for 14 days so can use 2 pens regardless of temperature upon delivery. Patient expressed understanding and thanked writer for calling.    Yen Henderson, PharmD, BCACP  Specialty Clinical Pharmacist  3/31/2022  13:55 EDT

## 2022-04-13 ENCOUNTER — SPECIALTY PHARMACY (OUTPATIENT)
Dept: ENDOCRINOLOGY | Facility: CLINIC | Age: 42
End: 2022-04-13

## 2022-04-13 NOTE — PROGRESS NOTES
Specialty Pharmacy Patient Management Program  Endocrinology Refill Outreach      Inocencio is a 42 y.o. male contacted today regarding refills of his specialty medication(s).    Specialty medication(s) and dose(s) confirmed: Toujeo Max, Xigduo     Refill Questions    Flowsheet Row Most Recent Value   Changes to allergies? No   Changes to medications? No   New conditions since last clinic visit No   Unplanned office visit, urgent care, ED, or hospital admission in the last 4 weeks  No   How does patient/caregiver feel medication is working? Very good   Financial problems or insurance changes  No   If yes, describe changes in insurance or financial issues. N/A   How many doses of your specialty medications were missed in the last 4 weeks? None reported   Why were doses missed? N/A   Does this patient require a clinical escalation to a pharmacist? No          Delivery Questions    Flowsheet Row Most Recent Value   Delivery method FedEx   Delivery address correct? Yes   Preferred delivery time? Anytime   Number of medications in delivery 2   Medication being filled and delivered Toujeo, Xigduo   Doses left of specialty medications A few days   Is there any medication that is due not being filled? No   Supplies needed? No supplies needed   Cooler needed? Yes   Do any medications need mixed or dated? No   Copay form of payment Credit card on file   Additional comments $0 copay   Questions or concerns for the pharmacist? No            Follow-up: 3-4 weeks     Yen Henderson, PharmD, BCACP  Specialty Clinical Pharmacist  4/13/2022  11:00 EDT

## 2022-04-25 ENCOUNTER — SPECIALTY PHARMACY (OUTPATIENT)
Dept: ENDOCRINOLOGY | Facility: CLINIC | Age: 42
End: 2022-04-25

## 2022-04-25 NOTE — PROGRESS NOTES
Specialty Pharmacy Patient Management Program  Endocrinology Refill Outreach      Inocencio is a 42 y.o. male contacted today regarding refills of his specialty medication(s).    Specialty medication(s) and dose(s) confirmed: Trulicity  Other medications being refilled: Freestyle Gloria Sensors    Refill Questions    Flowsheet Row Most Recent Value   Changes to allergies? No   Changes to medications? No   New conditions since last clinic visit No   Unplanned office visit, urgent care, ED, or hospital admission in the last 4 weeks  No   Financial problems or insurance changes  No   How many doses of your specialty medications were missed in the last 4 weeks? None that wife knew of   Does this patient require a clinical escalation to a pharmacist? No          Delivery Questions    Flowsheet Row Most Recent Value   Delivery method FedEx   Delivery address correct? Yes   Preferred delivery time? Anytime   Number of medications in delivery 2   Medication being filled and delivered Freestyle Gloria and Trulicity   Copay form of payment Credit card on file   Questions or concerns for the pharmacist? No          Spoke to patient's wife since patient was at work. According to her, fill whatever is due because he will need it either way.       Follow-up: 30 days      April Vickie Dodd  4/25/2022  10:16 EDT

## 2022-05-11 ENCOUNTER — SPECIALTY PHARMACY (OUTPATIENT)
Dept: ENDOCRINOLOGY | Facility: CLINIC | Age: 42
End: 2022-05-11

## 2022-05-11 NOTE — PROGRESS NOTES
Specialty Pharmacy Refill Coordination Note     Inocencio is a 42 y.o. male contacted today regarding refills of  Xigduo and Toujeo specialty medication(s). Also refilled pen needles. Scheduled Freestyle sensors for 5/18.     Reviewed and verified with patient:         Specialty medication(s) and dose(s) confirmed: yes    Refill Questions    Flowsheet Row Most Recent Value   Changes to allergies? No   Changes to medications? No   New conditions since last clinic visit No   Unplanned office visit, urgent care, ED, or hospital admission in the last 4 weeks  No   How does patient/caregiver feel medication is working? Excellent   Financial problems or insurance changes  No   If yes, describe changes in insurance or financial issues. N/A   Since the previous refill, were any specialty medication doses or scheduled injections missed or delayed?  No   If yes, please provide the amount N/A   Why were doses missed? N/A   Does this patient require a clinical escalation to a pharmacist? No          Delivery Questions    Flowsheet Row Most Recent Value   Delivery method FedEx   Delivery address correct? Yes   Preferred delivery time? Anytime   Number of medications in delivery 4   Medication being filled and delivered Pen needles, Xigduo, and Toujeo. Scheduled sensors for 5/18   Doses left of specialty medications about 1 week left   Is there any medication that is due not being filled? No   Supplies needed? No supplies needed   Do any medications need mixed or dated? Yes   Copay form of payment Credit card on file   Questions or concerns for the pharmacist? No   Are any medications first time fills? No                 Follow-up: 1 month(s)     Virginia Stevens, Pharmacy Technician  Specialty Pharmacy Technician

## 2022-05-27 ENCOUNTER — SPECIALTY PHARMACY (OUTPATIENT)
Dept: ENDOCRINOLOGY | Facility: CLINIC | Age: 42
End: 2022-05-27

## 2022-05-27 NOTE — PROGRESS NOTES
Specialty Pharmacy Patient Management Program  Endocrinology Refill Outreach      Inocencio is a 42 y.o. male contacted today regarding refills of his specialty medication(s).    Specialty medication(s) and dose(s) confirmed: Trulcity  Other medications being refilled: n/a    Refill Questions    Flowsheet Row Most Recent Value   Changes to allergies? No   Changes to medications? No   New conditions since last clinic visit No   Unplanned office visit, urgent care, ED, or hospital admission in the last 4 weeks  No   Financial problems or insurance changes  No   Since the previous refill, were any specialty medication doses or scheduled injections missed or delayed?  No   Does this patient require a clinical escalation to a pharmacist? No          Delivery Questions    Flowsheet Row Most Recent Value   Delivery method FedEx   Delivery address correct? Yes   Preferred delivery time? Anytime   Number of medications in delivery 1   Medication being filled and delivered Trulicity   Cooler needed? Yes   Copay form of payment Credit card on file   Questions or concerns for the pharmacist? No                 Follow-up: 28 days     April Vickie Dodd  5/27/2022  09:04 EDT

## 2022-06-06 ENCOUNTER — SPECIALTY PHARMACY (OUTPATIENT)
Dept: ENDOCRINOLOGY | Facility: CLINIC | Age: 42
End: 2022-06-06

## 2022-06-06 NOTE — PROGRESS NOTES
Specialty Pharmacy Refill Coordination Note     Inocencio is a 42 y.o. male contacted today regarding refills of  Toujeo and Xigduo specialty medication(s). Also refilled Freestyle sensors and pen needles. Scheduled fill for 6/15.     Reviewed and verified with patient:         Specialty medication(s) and dose(s) confirmed: yes    Refill Questions    Flowsheet Row Most Recent Value   Changes to allergies? No   Changes to medications? No   New conditions since last clinic visit No   Unplanned office visit, urgent care, ED, or hospital admission in the last 4 weeks  No   How does patient/caregiver feel medication is working? Excellent   Financial problems or insurance changes  No   If yes, describe changes in insurance or financial issues. N/A   Since the previous refill, were any specialty medication doses or scheduled injections missed or delayed?  No   If yes, please provide the amount N/A   Why were doses missed? N/A   Does this patient require a clinical escalation to a pharmacist? No          Delivery Questions    Flowsheet Row Most Recent Value   Delivery method FedEx   Delivery address correct? Yes   Preferred delivery time? Anytime   Number of medications in delivery 4   Medication being filled and delivered Pen Needles, Freestyle sensors, Xigduo, Toujeo   Doses left of specialty medications Patient was at work-Not sure   Is there any medication that is due not being filled? No   Supplies needed? No supplies needed   Cooler needed? Yes   Do any medications need mixed or dated? No   Copay form of payment Credit card on file   Additional comments Patient will call back w/ new credit card info   Questions or concerns for the pharmacist? No   Are any medications first time fills? No                 Follow-up: 25 day(s)     Virginia Stevens, Pharmacy Technician  Specialty Pharmacy Technician

## 2022-06-22 ENCOUNTER — SPECIALTY PHARMACY (OUTPATIENT)
Dept: ENDOCRINOLOGY | Facility: CLINIC | Age: 42
End: 2022-06-22

## 2022-06-22 NOTE — PROGRESS NOTES
Specialty Pharmacy Patient Management Program  Endocrinology Refill Outreach      Inocencio is a 42 y.o. male contacted today regarding refills of his medication(s).    Specialty medication(s) and dose(s) confirmed:Trulicity  Other medications being refilled: na    Refill Questions    Flowsheet Row Most Recent Value   Changes to allergies? No   Changes to medications? No   New conditions since last clinic visit No   Unplanned office visit, urgent care, ED, or hospital admission in the last 4 weeks  No   How does patient/caregiver feel medication is working? Very good   Financial problems or insurance changes  No   Since the previous refill, were any specialty medication doses or scheduled injections missed or delayed?  No   Does this patient require a clinical escalation to a pharmacist? No          Delivery Questions    Flowsheet Row Most Recent Value   Delivery method FedEx   Delivery address correct? Yes   Delivery phone number [phone]   Preferred delivery time? Anytime   Number of medications in delivery 1   Medication being filled and delivered Trulicity   Doses left of specialty medications na   Is there any medication that is due not being filled? No   Supplies needed? No supplies needed   Cooler needed? Yes   Do any medications need mixed or dated? No   Copay form of payment Credit card on file   Additional comments Copay $25.00   Questions or concerns for the pharmacist? No   Explain any questions or concerns for the pharmacist na   Are any medications first time fills? No   Shipment status Cooler packed                 Follow-up: 28D     Anika Isbell, Care Coordinator   Endocrinology  6/22/2022  10:48 EDT

## 2022-07-14 ENCOUNTER — SPECIALTY PHARMACY (OUTPATIENT)
Dept: ENDOCRINOLOGY | Facility: CLINIC | Age: 42
End: 2022-07-14

## 2022-07-14 NOTE — PROGRESS NOTES
Specialty Pharmacy Patient Management Program  Endocrinology Refill Outreach      Inocencio is a 42 y.o. male contacted today regarding refills of his medication(s).    Specialty medication(s) and dose(s) confirmed: Toujeo, Xigduo    Other medications being refilled: Sensors, and Needles     Refill Questions    Flowsheet Row Most Recent Value   Changes to allergies? No   Changes to medications? No   New conditions since last clinic visit No   Unplanned office visit, urgent care, ED, or hospital admission in the last 4 weeks  No   How does patient/caregiver feel medication is working? Very good   Financial problems or insurance changes  No   Does this patient require a clinical escalation to a pharmacist? No          Delivery Questions    Flowsheet Row Most Recent Value   Delivery method FedEx   Delivery address correct? Yes   Delivery phone number [phone]   Preferred delivery time? Anytime   Number of medications in delivery 4   Medication being filled and delivered Sensors, Needles, Toujeo, Xigduo   Doses left of specialty medications na   Is there any medication that is due not being filled? No   Supplies needed? No supplies needed   Cooler needed? Yes   Do any medications need mixed or dated? No   Copay form of payment Credit card on file   Additional comments Copay $10.00   Questions or concerns for the pharmacist? No   Are any medications first time fills? No   Shipment status Cooler packed                 Follow-up: 28D     Anika Isbell, Care Coordinator   Endocrinology  7/14/2022  12:12 EDT

## 2022-07-19 ENCOUNTER — SPECIALTY PHARMACY (OUTPATIENT)
Dept: ENDOCRINOLOGY | Facility: CLINIC | Age: 42
End: 2022-07-19

## 2022-07-19 ENCOUNTER — OFFICE VISIT (OUTPATIENT)
Dept: ENDOCRINOLOGY | Facility: CLINIC | Age: 42
End: 2022-07-19

## 2022-07-19 ENCOUNTER — LAB (OUTPATIENT)
Dept: LAB | Facility: HOSPITAL | Age: 42
End: 2022-07-19

## 2022-07-19 VITALS
OXYGEN SATURATION: 96 % | DIASTOLIC BLOOD PRESSURE: 73 MMHG | HEART RATE: 85 BPM | BODY MASS INDEX: 36.57 KG/M2 | HEIGHT: 72 IN | SYSTOLIC BLOOD PRESSURE: 124 MMHG | WEIGHT: 270 LBS

## 2022-07-19 DIAGNOSIS — E78.2 MIXED HYPERLIPIDEMIA: ICD-10-CM

## 2022-07-19 DIAGNOSIS — E11.65 UNCONTROLLED TYPE 2 DIABETES MELLITUS WITH HYPERGLYCEMIA: Primary | ICD-10-CM

## 2022-07-19 DIAGNOSIS — I10 BENIGN HYPERTENSION: ICD-10-CM

## 2022-07-19 DIAGNOSIS — Z79.4 TYPE 2 DIABETES MELLITUS WITH DIABETIC POLYNEUROPATHY, WITH LONG-TERM CURRENT USE OF INSULIN: ICD-10-CM

## 2022-07-19 DIAGNOSIS — E11.42 TYPE 2 DIABETES MELLITUS WITH DIABETIC POLYNEUROPATHY, WITH LONG-TERM CURRENT USE OF INSULIN: ICD-10-CM

## 2022-07-19 DIAGNOSIS — E11.65 UNCONTROLLED TYPE 2 DIABETES MELLITUS WITH HYPERGLYCEMIA: ICD-10-CM

## 2022-07-19 LAB
ALBUMIN SERPL-MCNC: 4.5 G/DL (ref 3.5–5.2)
ALBUMIN UR-MCNC: <1.2 MG/DL
ALBUMIN/GLOB SERPL: 1.4 G/DL
ALP SERPL-CCNC: 64 U/L (ref 39–117)
ALT SERPL W P-5'-P-CCNC: 34 U/L (ref 1–41)
ANION GAP SERPL CALCULATED.3IONS-SCNC: 10.1 MMOL/L (ref 5–15)
AST SERPL-CCNC: 21 U/L (ref 1–40)
BILIRUB SERPL-MCNC: 0.4 MG/DL (ref 0–1.2)
BUN SERPL-MCNC: 18 MG/DL (ref 6–20)
BUN/CREAT SERPL: 17.1 (ref 7–25)
CALCIUM SPEC-SCNC: 9.4 MG/DL (ref 8.6–10.5)
CHLORIDE SERPL-SCNC: 103 MMOL/L (ref 98–107)
CHOLEST SERPL-MCNC: 112 MG/DL (ref 0–200)
CO2 SERPL-SCNC: 25.9 MMOL/L (ref 22–29)
CREAT SERPL-MCNC: 1.05 MG/DL (ref 0.76–1.27)
CREAT UR-MCNC: 118.6 MG/DL
EGFRCR SERPLBLD CKD-EPI 2021: 90.9 ML/MIN/1.73
EXPIRATION DATE: NORMAL
EXPIRATION DATE: NORMAL
GLOBULIN UR ELPH-MCNC: 3.3 GM/DL
GLUCOSE BLDC GLUCOMTR-MCNC: 94 MG/DL (ref 70–130)
GLUCOSE SERPL-MCNC: 87 MG/DL (ref 65–99)
HBA1C MFR BLD: 5.9 %
HDLC SERPL-MCNC: 37 MG/DL (ref 40–60)
LDLC SERPL CALC-MCNC: 63 MG/DL (ref 0–100)
LDLC/HDLC SERPL: 1.77 {RATIO}
Lab: NORMAL
Lab: NORMAL
MICROALBUMIN/CREAT UR: NORMAL MG/G{CREAT}
POTASSIUM SERPL-SCNC: 4.7 MMOL/L (ref 3.5–5.2)
PROT SERPL-MCNC: 7.8 G/DL (ref 6–8.5)
SODIUM SERPL-SCNC: 139 MMOL/L (ref 136–145)
TRIGL SERPL-MCNC: 48 MG/DL (ref 0–150)
TSH SERPL DL<=0.05 MIU/L-ACNC: 0.88 UIU/ML (ref 0.27–4.2)
VLDLC SERPL-MCNC: 12 MG/DL (ref 5–40)

## 2022-07-19 PROCEDURE — 84443 ASSAY THYROID STIM HORMONE: CPT

## 2022-07-19 PROCEDURE — 80061 LIPID PANEL: CPT

## 2022-07-19 PROCEDURE — 99214 OFFICE O/P EST MOD 30 MIN: CPT | Performed by: INTERNAL MEDICINE

## 2022-07-19 PROCEDURE — 82947 ASSAY GLUCOSE BLOOD QUANT: CPT | Performed by: INTERNAL MEDICINE

## 2022-07-19 PROCEDURE — 80053 COMPREHEN METABOLIC PANEL: CPT

## 2022-07-19 PROCEDURE — 82570 ASSAY OF URINE CREATININE: CPT

## 2022-07-19 PROCEDURE — 82043 UR ALBUMIN QUANTITATIVE: CPT

## 2022-07-19 PROCEDURE — 83036 HEMOGLOBIN GLYCOSYLATED A1C: CPT | Performed by: INTERNAL MEDICINE

## 2022-07-19 RX ORDER — MOXIFLOXACIN 5 MG/ML
SOLUTION/ DROPS OPHTHALMIC
COMMUNITY
Start: 2022-05-09

## 2022-07-19 RX ORDER — SIMVASTATIN 20 MG
20 TABLET ORAL
COMMUNITY
Start: 2022-06-08 | End: 2023-06-08

## 2022-07-19 NOTE — PROGRESS NOTES
Specialty Pharmacy Patient Management Program  Endocrinology Reassessment     Inocencio Hicks is a 42 y.o. male with Type 2 Diabetes seen by an Endocrinology provider and enrolled in the Endocrinology Patient Management program offered by [facility].  A follow-up outreach was conducted, including assessment of continued therapy appropriateness, medication adherence, and side effect incidence and management for Trulicity, Xigduo and Toujeo.    Changes to Insurance Coverage or Financial Support  None    Relevant Past Medical History and Comorbidities  Relevant medical history and concomitant health conditions were discussed with the patient. The patient's chart has been reviewed for relevant past medical history and comorbid health conditions and updated as necessary.   Past Medical History:   Diagnosis Date   • Diabetes mellitus (HCC)    • Hyperlipidemia    • Hypertension    • Type 2 diabetes mellitus (HCC)      Social History     Socioeconomic History   • Marital status:    Tobacco Use   • Smoking status: Former Smoker     Quit date: 2010     Years since quittin.6   • Smokeless tobacco: Never Used   Vaping Use   • Vaping Use: Never used   Substance and Sexual Activity   • Alcohol use: Yes     Comment: rare   • Drug use: Not Currently   • Sexual activity: Defer       Problem list reviewed by Lila Nguyen RPH on 2022 at 10:19 AM    Allergies  Known allergies and reactions were discussed with the patient. The patient's chart has been reviewed for allergy information and updated as necessary.   Byetta 10 mcg pen [exenatide] and Victoza [liraglutide]    Allergies reviewed by Lila Nguyen RPH on 2022 at 10:18 AM    Relevant Laboratory Values  A1C Last 3 Results    HGBA1C Last 3 Results 10/28/21 2/17/22 7/19/22   Hemoglobin A1C 6.2 6.4 5.9           Lab Results   Component Value Date    HGBA1C 5.9 2022     No results found for: GLUCOSE, CALCIUM, NA, K,  CO2, CL, BUN, CREATININE, EGFRIFAFRI, EGFRIFNONA, BCR, ANIONGAP  No results found for: CHOL, CHLPL, TRIG, HDL, LDL, LDLDIRECT      Current Medication List  This medication list has been reviewed with the patient and evaluated for any interactions or necessary modifications/recommendations, and updated to include all prescription medications, OTC medications, and supplements the patient is currently taking.  This list reflects what is contained in the patient's profile, which has also been marked as reviewed to communicate to other providers it is the most up to date version of the patient's current medication therapy.     Current Outpatient Medications:   •  albuterol sulfate  (90 Base) MCG/ACT inhaler, Inhale 2 puffs As Needed., Disp: , Rfl:   •  celecoxib (CeleBREX) 200 MG capsule, Take 200 mg by mouth Daily., Disp: , Rfl:   •  Continuous Blood Gluc Sensor (ZerveStyle Gloria 14 Day Sensor) misc, Change sensor every 14 days, Disp: 2 each, Rfl: 11  •  dapagliflozin-metformin HCl ER (Xigduo XR) 5-1000 MG tablet, Take 2 tablets by mouth Daily., Disp: 60 tablet, Rfl: 11  •  Dulaglutide (Trulicity) 3 MG/0.5ML solution pen-injector, Inject 0.5 mL under the skin into the appropriate area as directed Every 7 (Seven) Days., Disp: 2 mL, Rfl: 11  •  DULoxetine (CYMBALTA) 60 MG capsule, Take 60 mg by mouth Daily., Disp: , Rfl:   •  fexofenadine (ALLEGRA) 180 MG tablet, Take 180 mg by mouth Daily., Disp: , Rfl:   •  Insulin Glargine, 2 Unit Dial, (Toujeo Max SoloStar) 300 UNIT/ML solution pen-injector injection, Inject 50 Units under the skin into the appropriate area as directed Daily., Disp: 6 mL, Rfl: 11  •  Insulin Pen Needle (B-D UF III MINI PEN NEEDLES) 31G X 5 MM misc, Use as directed for insulin admininstration, Disp: 100 each, Rfl: 3  •  lisinopril (PRINIVIL,ZESTRIL) 20 MG tablet, Take 40 mg by mouth Daily. Pt taking #2 20mg tablets once daily for 40mg daily dose, Disp: , Rfl:   •  moxifloxacin (VIGAMOX) 0.5 %  "ophthalmic solution, , Disp: , Rfl:   •  omeprazole (priLOSEC) 20 MG capsule, Take 40 mg by mouth Daily., Disp: , Rfl:   •  simvastatin (ZOCOR) 20 MG tablet, Take 20 mg by mouth., Disp: , Rfl:   •  SUMAtriptan (IMITREX) 100 MG tablet, Take 100 mg by mouth As Needed., Disp: , Rfl:   •  vitamin D (ERGOCALCIFEROL) 1.25 MG (18454 UT) capsule capsule, Take 50,000 Units by mouth 1 (One) Time Per Week., Disp: , Rfl:     Medicines reviewed by Lila Nguyen RPH on 7/19/2022 at 10:19 AM    Drug Interactions  No Clinically Significant DDIs Noted at Present Time on Medication Review    Recommended Medications Assessment  • Aspirin - Not Taking Currently  • Statin - Currently Taking   • ACEi/ARB - Currently Taking     Adverse Drug Reactions  • Adverse Reactions Experienced: None   • Plan for ADR Management: Not Required    Hospitalizations and Urgent Care Since Last Assessment  • Hospitalizations or Admissions: None  • ED Visits: None  • Urgent Office Visits: None    Adherence and Self-Administration  Adherence related patient's specialty therapy was discussed with the patient. The Adherence segment of this outreach has been reviewed and updated.     • Ongoing or New Barriers to Patient Self-Administration: Medication Cost, \"Catching up with Life\"  • Methods for Supporting Patient Self-Administration: Use  Coupons Where Available, Continue Regular Follow-Up (Every 30 Days to Check in), Put Patient on [facility] Waitlist    Goals of Therapy  Goals related to the patient's specialty therapy was discussed with the patient. The Patient Goals segment of this outreach has been reviewed and updated.    Goals     •  Specialty Pharmacy General Goal (pt-stated)       Pt stated: Take medication daily as prescribed         •  Specialty Pharmacy General Goal       Clinical Target: Maintain A1c 6-7%             Quality of Life Assessment   Quality of Life related to the patient's specialty therapy was discussed with the patient. The " QOL segment of this outreach has been reviewed and updated.     Quality of Life Assessment  Quality of Life Improvement Scale: A little better    Reassessment Plan & Follow-Up  1. Medication Therapy Changes: No changes to specialty therapy today.   2. Additional Plans, Therapy Recommendations, or Therapy Problems to Be Addressed: None at this time. May need to create MTP if financial or adherence concerns present in future. For now, patient is well controlled, meeting goals, and PDC >80%    3. Pharmacist to perform regular reassessments no more than (6) months from the previous assessment.  4. Care Coordinator to set up future refill outreaches, coordinate prescription delivery, and escalate clinical questions to pharmacist.     Attestation  I attest that the specialty medication(s) addressed above are appropriate for the patient based on my reassessment.  If the prescribed therapy is at any point deemed not appropriate based on the current or future assessments, a consultation will be initiated with the patient's specialty care provider to determine the best course of action. The revised plan of therapy will be documented along with any additional patient education provided.     Lila Nguyen, PharmD   7/19/2022  10:24 EDT

## 2022-07-19 NOTE — PROGRESS NOTES
"     Office Note      Date: 2022  Patient Name: Inocencio Hicks  MRN: [id number]  : 1980    Chief Complaint   Patient presents with   • Diabetes       History of Present Illness:   Inocencio Hicks is a 42 y.o. male who presents for Diabetes type 2. Diagnosed in: . Treated in past with oral agents. Current treatments: farxiga, metformin, trulicity and basal insulin. Number of insulin shots per day: 1. Checks blood sugar 288x per day - on FreeStyle Gloria. Has low blood sugar: occasional. Aspirin use: No - no indication. Statin use: Yes. ACE-I/ARB use: Yes. Changes in health since last visit: none. Last eye exam 2022.    Subjective      Diabetic Complications:  Eyes: No  Kidneys: No  Feet: Yes - on cymbalta  Heart: No    Diet and Exercise:  Meals per day: 3  Minutes of exercise per week: 0 mins.    Review of Systems:   Review of Systems   Constitutional: Negative.    Cardiovascular: Negative.    Gastrointestinal: Negative.    Endocrine: Negative.        The following portions of the patient's history were reviewed and updated as appropriate: allergies, current medications, past family history, past medical history, past social history, past surgical history and problem list.    Objective       Visit Vitals  /73   Pulse 85   Ht 182.9 cm (72\")   Wt 122 kg (270 lb)   SpO2 96%   BMI 36.62 kg/m²       Physical Exam:  Physical Exam  Constitutional:       Appearance: Normal appearance.   Cardiovascular:      Pulses:           Dorsalis pedis pulses are 2+ on the right side and 2+ on the left side.        Posterior tibial pulses are 2+ on the right side and 2+ on the left side.   Feet:      Right foot:      Protective Sensation: 5 sites tested. 5 sites sensed.      Skin integrity: Callus present.      Toenail Condition: Right toenails are abnormally thick.      Left foot:      Protective Sensation: 5 sites tested. 5 sites sensed.      Skin integrity: Callus present.      Toenail Condition: Left " toenails are abnormally thick.      Comments: Calluses on medial aspect of both 1st toes  Neurological:      Mental Status: He is alert.         Labs:    HbA1c  Lab Results   Component Value Date    HGBA1C 5.9 07/19/2022       CMP  No results found for: GLUCOSE, BUN, CREATININE, EGFRIFNONA, EGFRIFAFRI, BCR, K, CO2, CALCIUM, PROTENTOTREF, LABIL2, BILIRUBIN, AST, ALT     Lipid Panel        TSH  Lab Results   Component Value Date    TSH 1.180 10/28/2021        Hemoglobin A1C  Lab Results   Component Value Date    HGBA1C 5.9 07/19/2022        Microalbumin/Creatinine  No results found for: MALBCRERATIO, CREATINIURIN, MICROALBUR        Assessment / Plan      Assessment & Plan:  Diagnoses and all orders for this visit:    1. Uncontrolled type 2 diabetes mellitus with hyperglycemia (HCC) (Primary)  Assessment & Plan:  Diabetes is improving with treatment.   Continue current treatment regimen.  Diabetes will be reassessed in 3 months.    Orders:  -     POC Glucose, Blood  -     POC Glycosylated Hemoglobin (Hb A1C)  -     Comprehensive Metabolic Panel; Future  -     Lipid Panel; Future  -     Microalbumin / Creatinine Urine Ratio - Urine, Clean Catch; Future  -     TSH; Future    2. Type 2 diabetes mellitus with diabetic polyneuropathy, with long-term current use of insulin (HCC)  Assessment & Plan:  Continue cymbalta.  Foot exam okay today.      3. Benign hypertension  Assessment & Plan:  Hypertension is unchanged.  Continue current treatment regimen.  Blood pressure will be reassessed at the next regular appointment.      4. Mixed hyperlipidemia  Assessment & Plan:  Continue statin.  Check lipids today.        Return in about 3 months (around 10/19/2022) for Recheck with A1c.    Brice Moura MD   07/19/2022

## 2022-08-03 ENCOUNTER — SPECIALTY PHARMACY (OUTPATIENT)
Dept: ENDOCRINOLOGY | Facility: CLINIC | Age: 42
End: 2022-08-03

## 2022-08-03 NOTE — PROGRESS NOTES
Specialty Pharmacy Patient Management Program  Endocrinology Refill Outreach      Inocencio is a 42 y.o. male contacted today regarding refills of his medication(s).    Specialty medication(s) and dose(s) confirmed: Trulicity  Other medications being refilled: na    Refill Questions    Flowsheet Row Most Recent Value   Changes to allergies? No   Changes to medications? No   New conditions since last clinic visit No   Unplanned office visit, urgent care, ED, or hospital admission in the last 4 weeks  No   How does patient/caregiver feel medication is working? Very good   Financial problems or insurance changes  No   Since the previous refill, were any specialty medication doses or scheduled injections missed or delayed?  No   Does this patient require a clinical escalation to a pharmacist? No          Delivery Questions    Flowsheet Row Most Recent Value   Delivery address correct? Yes   Delivery phone number [phone]   Preferred delivery time? Anytime   Number of medications in delivery 1   Medication being filled and delivered Trulicity   Doses left of specialty medications na   Is there any medication that is due not being filled? No   Supplies needed? No supplies needed   Cooler needed? Yes   Do any medications need mixed or dated? No   Copay form of payment Credit card on file   Additional comments Copay $25.00 USE CARD ENDING IN [id number]   Questions or concerns for the pharmacist? No   Explain any questions or concerns for the pharmacist NA   Are any medications first time fills? No   Shipment status Cooler packed                 Follow-up: CherieD     Anika Isbell, Care Coordinator   Endocrinology  8/3/2022  14:28 EDT

## 2022-08-16 ENCOUNTER — SPECIALTY PHARMACY (OUTPATIENT)
Dept: ENDOCRINOLOGY | Facility: CLINIC | Age: 42
End: 2022-08-16

## 2022-08-16 NOTE — PROGRESS NOTES
Specialty Pharmacy Patient Management Program  Endocrinology Refill Outreach      Inocencio is a 42 y.o. male contacted today regarding refills of his medication(s).    Specialty medication(s) and dose(s) confirmed: ToClaudia ochoa  Other medications being refilled: Sensors    Refill Questions    Flowsheet Row Most Recent Value   Changes to allergies? No   Changes to medications? No   New conditions since last clinic visit No   Unplanned office visit, urgent care, ED, or hospital admission in the last 4 weeks  No   How does patient/caregiver feel medication is working? Very good   If yes, describe changes in insurance or financial issues. na   Since the previous refill, were any specialty medication doses or scheduled injections missed or delayed?  No   If yes, please provide the amount na   Why were doses missed? na   Does this patient require a clinical escalation to a pharmacist? No          Delivery Questions    Flowsheet Row Most Recent Value   Delivery method FedEx   Delivery address correct? Yes   Delivery phone number [phone]   Preferred delivery time? Anytime   Medication being filled and delivered Sensors, Toujeo, Xijduo   Doses left of specialty medications na   Is there any medication that is due not being filled? No   Supplies needed? No supplies needed   Cooler needed? Yes   Do any medications need mixed or dated? No   Copay form of payment Credit card on file   Additional comments Copay $`0.00 USE VISA 2594 FOR PYMT   Questions or concerns for the pharmacist? No   Explain any questions or concerns for the pharmacist NA   Are any medications first time fills? No   Tracking number for delivery N   Shipment status Cooler packed                 Follow-up: 28D     Anika Isbell, Care Coordinator   Endocrinology  8/16/2022  10:35 EDT

## 2022-08-30 ENCOUNTER — SPECIALTY PHARMACY (OUTPATIENT)
Dept: ENDOCRINOLOGY | Facility: CLINIC | Age: 42
End: 2022-08-30

## 2022-08-30 NOTE — PROGRESS NOTES
Specialty Pharmacy Patient Management Program  Endocrinology Refill Outreach      Inocencio is a 42 y.o. male contacted today regarding refills of his medication(s).    Specialty medication(s) and dose(s) confirmed: Trulicity  Other medications being refilled: na    Refill Questions    Flowsheet Row Most Recent Value   Changes to allergies? No   Changes to medications? No   New conditions since last clinic visit No   Unplanned office visit, urgent care, ED, or hospital admission in the last 4 weeks  No   How does patient/caregiver feel medication is working? Very good   Financial problems or insurance changes  No   If yes, describe changes in insurance or financial issues. na   Since the previous refill, were any specialty medication doses or scheduled injections missed or delayed?  No   If yes, please provide the amount na   Why were doses missed? na   Does this patient require a clinical escalation to a pharmacist? No          Delivery Questions    Flowsheet Row Most Recent Value   Delivery method FedEx   Delivery address correct? Yes   Delivery phone number [phone]   Preferred delivery time? Anytime   Number of medications in delivery 1   Medication being filled and delivered Trulicity   Doses left of specialty medications na   Is there any medication that is due not being filled? No   Supplies needed? No supplies needed   Cooler needed? Yes   Do any medications need mixed or dated? No   Copay form of payment Credit card on file   Additional comments Copay $25.00   Questions or concerns for the pharmacist? No   Explain any questions or concerns for the pharmacist na   Are any medications first time fills? No   Tracking number for delivery na   Shipment status Cooler packed                 Follow-up: 28 Days      Anika Isbell, Care Coordinator   Endocrinology  8/30/2022  13:10 EDT

## 2022-09-12 RX ORDER — PEN NEEDLE, DIABETIC 30 GX3/16"
1 NEEDLE, DISPOSABLE MISCELLANEOUS TAKE AS DIRECTED
Qty: 100 EACH | Refills: 11 | Status: SHIPPED | OUTPATIENT
Start: 2022-09-12

## 2022-09-12 NOTE — TELEPHONE ENCOUNTER
Specialty Pharmacy Patient Management Program  Prescription Refill Request     Patient currently fills medications at  Pharmacy. Needing refill(s) on pen needles.  Pended for Dr. Moura to review/approve as appropriate.       Geovanni Helms, PharmD, MPH  Clinical Specialty Pharmacist, Endocrinology  9/12/2022  11:41 EDT

## 2022-09-13 ENCOUNTER — SPECIALTY PHARMACY (OUTPATIENT)
Dept: PHARMACY | Facility: HOSPITAL | Age: 42
End: 2022-09-13

## 2022-09-13 RX ORDER — INSULIN GLARGINE 300 U/ML
50 INJECTION, SOLUTION SUBCUTANEOUS DAILY
Qty: 18 ML | Refills: 3 | Status: SHIPPED | OUTPATIENT
Start: 2022-09-13

## 2022-09-13 RX ORDER — FLASH GLUCOSE SENSOR
1 KIT MISCELLANEOUS
Qty: 6 EACH | Refills: 3 | Status: SHIPPED | OUTPATIENT
Start: 2022-09-13

## 2022-09-13 NOTE — TELEPHONE ENCOUNTER
Specialty Pharmacy Patient Management Program  Prescription Refill Request     Patient currently fills medications at  Pharmacy. Needing refill(s) on Toujeo and Freestyle Gloria for 90 day supplies.  Canceled previous refills for 30 day supplies.  Pended for Dr. Moura to review/approve as appropriate.       Geovanni Helms, PharmD, MPH  Clinical Specialty Pharmacist, Endocrinology  9/13/2022  11:52 EDT

## 2022-09-13 NOTE — PROGRESS NOTES
Specialty Pharmacy Patient Management Program  Endocrinology Refill Outreach      Inocencio is a 42 y.o. male contacted today regarding refills of his medication(s).    Specialty medication(s) and dose(s) confirmed: Xigduo, Toujeo  Other medications being refilled: Gloria Sensors    Refill Questions    Flowsheet Row Most Recent Value   Changes to allergies? No   Changes to medications? No   New conditions since last clinic visit No   Unplanned office visit, urgent care, ED, or hospital admission in the last 4 weeks  No   How does patient/caregiver feel medication is working? Very good   Financial problems or insurance changes  No   If yes, describe changes in insurance or financial issues. N/A   Since the previous refill, were any specialty medication doses or scheduled injections missed or delayed?  No   If yes, please provide the amount N/A   Why were doses missed? N/A   Does this patient require a clinical escalation to a pharmacist? No          Delivery Questions    Flowsheet Row Most Recent Value   Delivery method FedEx   Delivery address correct? Yes   Delivery phone number [phone]   Preferred delivery time? Anytime   Number of medications in delivery 3   Medication being filled and delivered Xigduo, Toujeo, Gloria 14D sensors   Doses left of specialty medications na   Is there any medication that is due not being filled? No   Supplies needed? No supplies needed   Cooler needed? Yes   Do any medications need mixed or dated? No   Copay form of payment Credit card on file   Additional comments Copay $10   Questions or concerns for the pharmacist? No   Explain any questions or concerns for the pharmacist N/A   Are any medications first time fills? No   Shipment status Cooler packed                 Follow-up: 28 Days        Geovanni Helms PharmD, MPH  Clinical Specialty Pharmacist, Endocrinology  9/13/2022  12:09 EDT

## 2022-09-28 ENCOUNTER — SPECIALTY PHARMACY (OUTPATIENT)
Dept: ENDOCRINOLOGY | Facility: CLINIC | Age: 42
End: 2022-09-28

## 2022-09-28 NOTE — PROGRESS NOTES
Specialty Pharmacy Patient Management Program  Endocrinology Refill Outreach      Inocencio is a 42 y.o. male contacted today regarding refills of his medication(s).    Specialty medication(s) and dose(s) confirmed: Trulicity  Other medications being refilled: na    Refill Questions    Flowsheet Row Most Recent Value   Changes to allergies? No   Changes to medications? No   New conditions since last clinic visit No   Unplanned office visit, urgent care, ED, or hospital admission in the last 4 weeks  No   How does patient/caregiver feel medication is working? Very good   Financial problems or insurance changes  No   If yes, describe changes in insurance or financial issues. NA   Since the previous refill, were any specialty medication doses or scheduled injections missed or delayed?  No   If yes, please provide the amount NA   Why were doses missed? NA   Does this patient require a clinical escalation to a pharmacist? No          Delivery Questions    Flowsheet Row Most Recent Value   Delivery method FedEx   Delivery address correct? Yes   Delivery phone number [phone]   Preferred delivery time? Anytime   Number of medications in delivery 1   Medication being filled and delivered Trulicity   Doses left of specialty medications NA   Is there any medication that is due not being filled? No   Supplies needed? No supplies needed   Cooler needed? Yes   Do any medications need mixed or dated? No   Copay form of payment Credit card on file   Additional comments NA   Questions or concerns for the pharmacist? No   Explain any questions or concerns for the pharmacist NA   Are any medications first time fills? No   Shipment status Cooler packed                 Follow-up: 28 Days      Anika Isbell, Care Coordinator   Endocrinology  9/28/2022  11:46 EDT

## 2022-10-11 ENCOUNTER — SPECIALTY PHARMACY (OUTPATIENT)
Dept: ENDOCRINOLOGY | Facility: CLINIC | Age: 42
End: 2022-10-11

## 2022-10-11 NOTE — PROGRESS NOTES
Specialty Pharmacy Patient Management Program  Endocrinology Refill Outreach      Inocencio is a 42 y.o. male contacted today regarding refills of his medication(s).    Specialty medication(s) and dose(s) confirmed: Xigduo  Other medications being refilled: Pen Needles     Refill Questions    Flowsheet Row Most Recent Value   Changes to allergies? No   Changes to medications? No   New conditions since last clinic visit No   Unplanned office visit, urgent care, ED, or hospital admission in the last 4 weeks  No   How does patient/caregiver feel medication is working? Very good   Financial problems or insurance changes  No   If yes, describe changes in insurance or financial issues. na   Since the previous refill, were any specialty medication doses or scheduled injections missed or delayed?  No   If yes, please provide the amount na   Why were doses missed? na   Does this patient require a clinical escalation to a pharmacist? No          Delivery Questions    Flowsheet Row Most Recent Value   Delivery method FedEx   Delivery address correct? Yes   Delivery phone number [phone]   Preferred delivery time? Anytime   Number of medications in delivery 2   Medication being filled and delivered Xigduo and Pen Needles   Doses left of specialty medications na   Is there any medication that is due not being filled? No   Supplies needed? No supplies needed   Cooler needed? No   Do any medications need mixed or dated? No   Copay form of payment Credit card on file   Additional comments Copay $0   Questions or concerns for the pharmacist? No   Explain any questions or concerns for the pharmacist na   Are any medications first time fills? No   Shipment status Delivery complete                 Follow-up: 30 Days      Anika Isbell, Care Coordinator   Endocrinology  10/11/2022  11:02 EDT

## 2022-10-24 ENCOUNTER — SPECIALTY PHARMACY (OUTPATIENT)
Dept: ENDOCRINOLOGY | Facility: CLINIC | Age: 42
End: 2022-10-24

## 2022-10-24 NOTE — PROGRESS NOTES
Specialty Pharmacy Patient Management Program  Endocrinology Refill Outreach      Inocencio is a 42 y.o. male contacted today regarding refills of his medication(s).    Specialty medication(s) and dose(s) confirmed: Trulicity  Other medications being refilled: na    Refill Questions    Flowsheet Row Most Recent Value   Changes to allergies? No   Changes to medications? No   New conditions since last clinic visit No   Unplanned office visit, urgent care, ED, or hospital admission in the last 4 weeks  No   How does patient/caregiver feel medication is working? Very good   Financial problems or insurance changes  No   Since the previous refill, were any specialty medication doses or scheduled injections missed or delayed?  No   Does this patient require a clinical escalation to a pharmacist? No          Delivery Questions    Flowsheet Row Most Recent Value   Delivery method FedEx   Delivery address correct? Yes   Delivery phone number [phone]   Preferred delivery time? Anytime   Number of medications in delivery 1   Medication being filled and delivered Trulicity   Is there any medication that is due not being filled? No   Supplies needed? No supplies needed   Cooler needed? Yes   Do any medications need mixed or dated? No   Copay form of payment Credit card on file   Additional comments Copay $25   Questions or concerns for the pharmacist? No   Are any medications first time fills? No   Shipment status Cooler packed                 Follow-up: 28 Days      Anika Isbell, Care Coordinator   Endocrinology  10/24/2022  11:12 EDT

## 2022-11-14 ENCOUNTER — SPECIALTY PHARMACY (OUTPATIENT)
Dept: ENDOCRINOLOGY | Facility: CLINIC | Age: 42
End: 2022-11-14

## 2022-11-14 NOTE — PROGRESS NOTES
Specialty Pharmacy Patient Management Program  Endocrinology Refill Outreach      Inocencio is a 42 y.o. male contacted today regarding refills of his medication(s).    Specialty medication(s) and dose(s) confirmed: Xigduo  Other medications being refilled: None    Refill Questions    Flowsheet Row Most Recent Value   Changes to allergies? No   Changes to medications? No   New conditions since last clinic visit No   Unplanned office visit, urgent care, ED, or hospital admission in the last 4 weeks  No   How does patient/caregiver feel medication is working? Very good   Financial problems or insurance changes  No   Since the previous refill, were any specialty medication doses or scheduled injections missed or delayed?  No   Does this patient require a clinical escalation to a pharmacist? No          Delivery Questions    Flowsheet Row Most Recent Value   Delivery method FedEx   Delivery address correct? Yes   Delivery phone number [phone]   Preferred delivery time? Anytime   Number of medications in delivery 1   Medication being filled and delivered Xigduo   Supplies needed? No supplies needed   Cooler needed? No   Do any medications need mixed or dated? No   Copay form of payment Credit card on file   Additional comments $0 Copay   Questions or concerns for the pharmacist? No   Are any medications first time fills? No   Shipment status Delivery complete                 Follow-up: 11/21 [facility] Refill Outreach      Geovanni Helms, PharmD, MPH  Clinical Specialty Pharmacist, Endocrinology  11/14/2022  09:47 EST       DTC Progress Note Recommendations/ Comments: Pt discussed with rounding team and Dr. Rain Ledesma. Plan to increase lantus to 50 units Q12hrs. Chart reviewed on vAa Harris during Multidisciplinary Rounds. A1c:  
Lab Results Component Value Date/Time Hemoglobin A1c 4.8 03/29/2019 02:56 PM  
 
 
 
 
Recent Glucose Results:  
Lab Results Component Value Date/Time  (H) 04/04/2019 05:01 AM  
 GLUCPOC 285 (H) 04/03/2019 11:54 PM  
 GLUCPOC 291 (H) 04/03/2019 05:38 PM  
 GLUCPOC 379 (H) 04/03/2019 12:10 PM  
  
 
Lab Results Component Value Date/Time Creatinine 0.76 04/04/2019 05:01 AM  
 
Estimated Creatinine Clearance: 87.2 mL/min (based on SCr of 0.76 mg/dL). Active Orders Diet DIET NPO  
  
 
PO intake: No data found. Will continue to follow as needed. Thank you. Zachary Phan, SUSIEN, RN, CDE [facility] Time spent: 5 min

## 2022-12-06 ENCOUNTER — SPECIALTY PHARMACY (OUTPATIENT)
Dept: ENDOCRINOLOGY | Facility: CLINIC | Age: 42
End: 2022-12-06

## 2022-12-06 NOTE — PROGRESS NOTES
Specialty Pharmacy Patient Management Program  Endocrinology Refill Outreach      Inocencio is a 42 y.o. male contacted today regarding refills of his medication(s).    Specialty medication(s) and dose(s) confirmed: Toujeo, Trulicity, Xigduo  Other medications being refilled: Gloria Sensors   Patient requested only 30 day supply at this time.     Refill Questions    Flowsheet Row Most Recent Value   Changes to allergies? No   Changes to medications? No   New conditions since last clinic visit No   Unplanned office visit, urgent care, ED, or hospital admission in the last 4 weeks  No   How does patient/caregiver feel medication is working? Very good   Financial problems or insurance changes  No   Since the previous refill, were any specialty medication doses or scheduled injections missed or delayed?  No   Does this patient require a clinical escalation to a pharmacist? No          Delivery Questions    Flowsheet Row Most Recent Value   Delivery method FedEx   Delivery address correct? Yes   Delivery phone number    Preferred delivery time? Anytime   Number of medications in delivery 3   Medication being filled and delivered Sensors, Toujeo, Trulicity, Xigduo   Is there any medication that is due not being filled? No   Supplies needed? No supplies needed   Cooler needed? Yes   Do any medications need mixed or dated? No   Copay form of payment Credit card on file   Additional comments Copay $35   Questions or concerns for the pharmacist? No   Are any medications first time fills? No   Shipment status Cooler packed                 Follow-up: 28 Days      Anika Isbell, Care Coordinator   Endocrinology  12/6/2022  09:03 EST

## 2023-01-04 ENCOUNTER — TELEPHONE (OUTPATIENT)
Dept: ENDOCRINOLOGY | Facility: CLINIC | Age: 43
End: 2023-01-04
Payer: COMMERCIAL

## 2023-01-04 ENCOUNTER — SPECIALTY PHARMACY (OUTPATIENT)
Dept: ENDOCRINOLOGY | Facility: CLINIC | Age: 43
End: 2023-01-04
Payer: COMMERCIAL

## 2023-01-04 ENCOUNTER — OFFICE VISIT (OUTPATIENT)
Dept: ENDOCRINOLOGY | Facility: CLINIC | Age: 43
End: 2023-01-04
Payer: COMMERCIAL

## 2023-01-04 VITALS
SYSTOLIC BLOOD PRESSURE: 118 MMHG | HEART RATE: 76 BPM | HEIGHT: 72 IN | BODY MASS INDEX: 37.11 KG/M2 | WEIGHT: 274 LBS | DIASTOLIC BLOOD PRESSURE: 70 MMHG | OXYGEN SATURATION: 95 %

## 2023-01-04 DIAGNOSIS — Z79.4 TYPE 2 DIABETES MELLITUS WITH DIABETIC POLYNEUROPATHY, WITH LONG-TERM CURRENT USE OF INSULIN: ICD-10-CM

## 2023-01-04 DIAGNOSIS — I10 BENIGN HYPERTENSION: ICD-10-CM

## 2023-01-04 DIAGNOSIS — E78.2 MIXED HYPERLIPIDEMIA: ICD-10-CM

## 2023-01-04 DIAGNOSIS — E11.65 UNCONTROLLED TYPE 2 DIABETES MELLITUS WITH HYPERGLYCEMIA: Primary | ICD-10-CM

## 2023-01-04 DIAGNOSIS — E11.42 TYPE 2 DIABETES MELLITUS WITH DIABETIC POLYNEUROPATHY, WITH LONG-TERM CURRENT USE OF INSULIN: ICD-10-CM

## 2023-01-04 LAB
EXPIRATION DATE: NORMAL
EXPIRATION DATE: NORMAL
GLUCOSE BLDC GLUCOMTR-MCNC: 108 MG/DL (ref 70–130)
HBA1C MFR BLD: 5.9 %
Lab: NORMAL
Lab: NORMAL

## 2023-01-04 PROCEDURE — 83036 HEMOGLOBIN GLYCOSYLATED A1C: CPT | Performed by: INTERNAL MEDICINE

## 2023-01-04 PROCEDURE — 99214 OFFICE O/P EST MOD 30 MIN: CPT | Performed by: INTERNAL MEDICINE

## 2023-01-04 PROCEDURE — 82947 ASSAY GLUCOSE BLOOD QUANT: CPT | Performed by: INTERNAL MEDICINE

## 2023-01-04 RX ORDER — DULAGLUTIDE 3 MG/.5ML
3 INJECTION, SOLUTION SUBCUTANEOUS
Qty: 6 ML | Refills: 3 | Status: SHIPPED | OUTPATIENT
Start: 2023-01-04 | End: 2023-04-07 | Stop reason: ALTCHOICE

## 2023-01-04 RX ORDER — DAPAGLIFLOZIN AND METFORMIN HYDROCHLORIDE 5; 1000 MG/1; MG/1
2 TABLET, FILM COATED, EXTENDED RELEASE ORAL DAILY
Qty: 180 TABLET | Refills: 3 | Status: SHIPPED | OUTPATIENT
Start: 2023-01-04

## 2023-01-04 NOTE — TELEPHONE ENCOUNTER
Specialty Pharmacy Patient Management Program  Prior Authorization Approval     Prior Authorization for Freestyle Precision Derrek Test Strips for use with Gloria 2 [place] was Approved    Approval Start Date: 1/4/23  Approval End Date: 1/4/24  Authorization / Reference / Case Number: [id number]    CoverMyMeds Key: [id number]    Scheduled new prior authorization renewal outreach task to be completed on 1/4/24.     Montana RiversD, BCACP  Clinical Specialty Pharmacist, Endocrinology  1/4/2023  11:57 EST

## 2023-01-04 NOTE — PROGRESS NOTES
Office Note      Date: 2023  Patient Name: Inocencio Hicks  MRN: [id number]  : 1980    Chief Complaint   Patient presents with   • Diabetes     Type II       History of Present Illness:   Inocencio Hicks is a 42 y.o. male who presents for Diabetes type 2. Diagnosed in: . Treated in past with oral agents. Current treatments: farxiga, metformin, trulicity and basal insulin. Number of insulin shots per day: 1. Checks blood sugar 288x per day - on FreeStyle Gloria. Has low blood sugar: occasional. Aspirin use: No - no indication. Statin use: Yes. ACE-I/ARB use: Yes. Changes in health since last visit: none. Last eye exam 2022.    Subjective      Diabetic Complications:  Eyes: No  Kidneys: No  Feet: Yes - on cymbalta  Heart: No    Diet and Exercise:  Meals per day: 3  Minutes of exercise per week: 0 mins.    Review of Systems:   Review of Systems   Constitutional: Negative.    Cardiovascular: Negative.    Gastrointestinal: Negative.    Endocrine: Negative.        The following portions of the patient's history were reviewed and updated as appropriate: allergies, current medications, past family history, past medical history, past social history, past surgical history and problem list.    Objective       Visit Vitals  /70 (BP Location: Left arm, Patient Position: Sitting, Cuff Size: Adult)   Pulse 76   Ht 182.9 cm (72\")   Wt 124 kg (274 lb)   SpO2 95%   BMI 37.16 kg/m²       Physical Exam:  Physical Exam  Constitutional:       Appearance: Normal appearance.   Neurological:      Mental Status: He is alert.         Labs:    HbA1c  Lab Results   Component Value Date    HGBA1C 5.9 2023       CMP  Lab Results   Component Value Date    GLUCOSE 87 2022    BUN 18 2022    CREATININE 1.05 2022    BCR 17.1 2022    K 4.7 2022    CO2 25.9 2022    CALCIUM 9.4 2022    AST 21 2022    ALT 34 2022        Lipid Panel  Lab Results   Component Value  Date    HDL 37 (L) 07/19/2022    LDL 63 07/19/2022    TRIG 48 07/19/2022        TSH  Lab Results   Component Value Date    TSH 0.876 07/19/2022        Hemoglobin A1C  Lab Results   Component Value Date    HGBA1C 5.9 01/04/2023        Microalbumin/Creatinine  Lab Results   Component Value Date    RHIANNAO  07/19/2022      Comment:      Unable to calculate    MICROALBUR <1.2 07/19/2022           Assessment / Plan      Assessment & Plan:  Diagnoses and all orders for this visit:    1. Uncontrolled type 2 diabetes mellitus with hyperglycemia (HCC) (Primary)  Assessment & Plan:  Diabetes is unchanged.  Unable to download recent CGM data.  Has been out of sensors for a couple of weeks.  Had some bad sensors.  [facility]  Continue current treatment regimen.  Diabetes will be reassessed in 3 months.    Orders:  -     POC Glucose, Blood  -     POC Glycosylated Hemoglobin (Hb A1C)    2. Type 2 diabetes mellitus with diabetic polyneuropathy, with long-term current use of insulin (HCC)    3. Benign hypertension  Assessment & Plan:  Hypertension is unchanged.  Continue current treatment regimen.  Blood pressure will be reassessed at the next regular appointment.      4. Mixed hyperlipidemia  Assessment & Plan:  Continue statin.      Current Outpatient Medications   Medication Instructions   • albuterol sulfate  (90 Base) MCG/ACT inhaler 2 puffs, Inhalation, As Needed   • celecoxib (CELEBREX) 200 mg, Oral, Daily   • Continuous Blood Gluc Sensor (FreeStyle Gloria 14 Day Sensor) misc Change sensor Every 14 (Fourteen) Days.   • dapagliflozin-metformin HCl ER (Xigduo XR) 5-1000 MG tablet 2 tablets, Oral, Daily   • Dulaglutide (Trulicity) 3 MG/0.5ML solution pen-injector Inject 0.5 mL (3mg) under the skin into the appropriate area as directed Every 7 (Seven) Days.   • DULoxetine (CYMBALTA) 60 mg, Oral, Daily   • fexofenadine (ALLEGRA) 180 mg, Oral, Daily   • Insulin Pen Needle (B-D UF III MINI  PEN NEEDLES) 31G X 5 MM misc Use as directed for insulin admininstration   • Insulin Pen Needle (Pen Needles) 31G X 5 MM misc Use as directed   • lisinopril (PRINIVIL,ZESTRIL) 40 mg, Oral, Daily, Pt taking #2 20mg tablets once daily for 40mg daily dose   • moxifloxacin (VIGAMOX) 0.5 % ophthalmic solution No dose, route, or frequency recorded.   • omeprazole (PRILOSEC) 40 mg, Oral, Daily   • simvastatin (ZOCOR) 20 mg, Oral   • SUMAtriptan (IMITREX) 100 mg, Oral, As Needed   • Toujeo Max SoloStar 50 Units, Subcutaneous, Daily   • vitamin D (ERGOCALCIFEROL) 50,000 Units, Oral, Weekly      Return in about 3 months (around 4/4/2023) for Recheck with A1c.    Brice Moura MD   01/04/2023

## 2023-01-04 NOTE — TELEPHONE ENCOUNTER
Specialty Pharmacy Patient Management Program  Prescription Refill Request     Patient currently fills medications at  Pharmacy. Requested prescription for test trips compatible with Gloria 2 reader for instances when sensor may not be working.     Also appears patient may now be able to get 90 day supplies.  Re-pending some current Rxs for 90 days to see if we can provide cost savings.     Requested Prescriptions     Pending Prescriptions Disp Refills   • glucose blood (FreeStyle Precision Derrek Test) test strip 100 each 3     Sig: Use with Gloria 2 [place] to Test Glucose When Needed (if Sensor is Not Working)   • Dulaglutide (Trulicity) 3 MG/0.5ML solution pen-injector 6 mL 3     Sig: Inject 0.5 mL (3mg) under the skin into the appropriate area as directed Every 7 (Seven) Days.   • dapagliflozin-metformin HCl ER (Xigduo XR) 5-1000 MG tablet 180 tablet 3     Sig: Take 2 tablets by mouth Daily.     Pended for Dr. Moura to review, and approve if appropriate.     Lila Nguyen, PharmD, BCACP  Clinical Specialty Pharmacist, Endocrinology  1/4/2023  10:33 EST

## 2023-01-04 NOTE — ASSESSMENT & PLAN NOTE
Diabetes is unchanged.  Unable to download recent CGM data.  Has been out of sensors for a couple of weeks.  Had some bad sensors.  Has contacted [employer] for replacements.  Continue current treatment regimen.  Diabetes will be reassessed in 3 months.

## 2023-01-04 NOTE — PROGRESS NOTES
Specialty Pharmacy Patient Management Program  Endocrinology Reassessment     Inocencio Hicks is a 42 y.o. male seen by an Endocrinology provider for Type 2 Diabetes and enrolled in the Endocrinology Patient Management program offered by [facility].  A follow-up outreach was conducted, including assessment of continued therapy appropriateness, medication adherence, and side effect incidence and management for Trulicity, Xigduo and Toujeo.    Changes to Insurance Coverage or Financial Support  None    Relevant Past Medical History and Comorbidities  Relevant medical history and concomitant health conditions were discussed with the patient. The patient's chart has been reviewed for relevant past medical history and comorbid health conditions and updated as necessary.   Past Medical History:   Diagnosis Date   • Diabetes mellitus (HCC)    • Hyperlipidemia    • Hypertension    • Type 2 diabetes mellitus (HCC)      Social History     Socioeconomic History   • Marital status:    Tobacco Use   • Smoking status: Former     Types: Cigarettes     Quit date: 2010     Years since quittin.1   • Smokeless tobacco: Never   Vaping Use   • Vaping Use: Never used   Substance and Sexual Activity   • Alcohol use: Yes     Comment: rare   • Drug use: Not Currently   • Sexual activity: Defer       Problem list reviewed by Lila Nguyen PharmD on 2023 at 10:22 AM    Allergies  Known allergies and reactions were discussed with the patient. The patient's chart has been reviewed for allergy information and updated as necessary.   Byetta 10 mcg pen [exenatide] and Victoza [liraglutide]    Allergies reviewed by Lila Nguyen, Jennyfer on 2023 at 10:22 AM    Relevant Laboratory Values  A1C Last 3 Results    HGBA1C Last 3 Results 22   Hemoglobin A1C 6.4 5.9 5.9           Lab Results   Component Value Date    HGBA1C 5.9 2023     Lab Results   Component Value Date     GLUCOSE 87 07/19/2022    CALCIUM 9.4 07/19/2022     07/19/2022    K 4.7 07/19/2022    CO2 25.9 07/19/2022     07/19/2022    BUN 18 07/19/2022    CREATININE 1.05 07/19/2022    BCR 17.1 07/19/2022    ANIONGAP 10.1 07/19/2022     Lab Results   Component Value Date    CHOL 112 07/19/2022    TRIG 48 07/19/2022    HDL 37 (L) 07/19/2022    LDL 63 07/19/2022     Microalbumin    Microalbumin 7/19/22   Microalbumin, Urine <1.2           Current Medication List  This medication list has been reviewed with the patient and evaluated for any interactions or necessary modifications/recommendations, and updated to include all prescription medications, OTC medications, and supplements the patient is currently taking.  This list reflects what is contained in the patient's profile, which has also been marked as reviewed to communicate to other providers it is the most up to date version of the patient's current medication therapy.     Current Outpatient Medications:   •  albuterol sulfate  (90 Base) MCG/ACT inhaler, Inhale 2 puffs As Needed., Disp: , Rfl:   •  celecoxib (CeleBREX) 200 MG capsule, Take 200 mg by mouth Daily., Disp: , Rfl:   •  Continuous Blood Gluc Sensor (SparCodeStyle Gloria 14 Day Sensor) misc, Change sensor Every 14 (Fourteen) Days., Disp: 6 each, Rfl: 3  •  dapagliflozin-metformin HCl ER (Xigduo XR) 5-1000 MG tablet, Take 2 tablets by mouth Daily., Disp: 180 tablet, Rfl: 3  •  Dulaglutide (Trulicity) 3 MG/0.5ML solution pen-injector, Inject 0.5 mL (3mg) under the skin into the appropriate area as directed Every 7 (Seven) Days., Disp: 6 mL, Rfl: 3  •  DULoxetine (CYMBALTA) 60 MG capsule, Take 60 mg by mouth Daily., Disp: , Rfl:   •  fexofenadine (ALLEGRA) 180 MG tablet, Take 180 mg by mouth Daily., Disp: , Rfl:   •  glucose blood (FreeStyle Precision Derrek Test) test strip, Use with Gloria 2 Jefferson to Test Glucose When Needed (if Sensor is Not Working), Disp: 100 each, Rfl: 3  •  Insulin Glargine, 2 Unit  Dial, (Toujeo Max SoloStar) 300 UNIT/ML solution pen-injector injection, Inject 50 Units under the skin into the appropriate area as directed Daily., Disp: 18 mL, Rfl: 3  •  Insulin Pen Needle (B-D UF III MINI PEN NEEDLES) 31G X 5 MM misc, Use as directed for insulin admininstration, Disp: 100 each, Rfl: 3  •  Insulin Pen Needle (Pen Needles) 31G X 5 MM misc, Use as directed, Disp: 100 each, Rfl: 11  •  lisinopril (PRINIVIL,ZESTRIL) 20 MG tablet, Take 40 mg by mouth Daily. Pt taking #2 20mg tablets once daily for 40mg daily dose, Disp: , Rfl:   •  moxifloxacin (VIGAMOX) 0.5 % ophthalmic solution, , Disp: , Rfl:   •  omeprazole (priLOSEC) 20 MG capsule, Take 40 mg by mouth Daily., Disp: , Rfl:   •  simvastatin (ZOCOR) 20 MG tablet, Take 20 mg by mouth., Disp: , Rfl:   •  SUMAtriptan (IMITREX) 100 MG tablet, Take 100 mg by mouth As Needed., Disp: , Rfl:   •  vitamin D (ERGOCALCIFEROL) 1.25 MG (52842 UT) capsule capsule, Take 50,000 Units by mouth 1 (One) Time Per Week., Disp: , Rfl:     Medicines reviewed by Lila Nguyen, PharmD on 1/4/2023 at 10:22 AM    Drug Interactions  No Clinically Significant DDIs Were Identified at Present Time Upon Marking Medications Reviewed    Recommended Medications Assessment  • Aspirin: Not Indicated  • Statin: Currently Taking   • ACEi/ARB: Currently Taking     Adverse Drug Reactions  • Adverse Reactions Experienced: None Reported  • Plan for ADR Management: N/A    Hospitalizations and Urgent Care Since Last Assessment  • ED Visits, Admissions, or Hospitalizations: Elective Outpatient Surgery (8/26/22) at [facility], None Others Reported  • Urgent Office Visits: None    Adherence, Self-Administration, and Current Therapy Problems  Adherence related to the patient's specialty therapy was discussed with the patient. The Adherence segment of this outreach has been reviewed and updated.     Adherence Questions  Medication(s) assessed: Xigduo, Trulicity, Toujeo  On average, how many  doses/injections does the patient miss per month?: 0  What are the identified reasons for non-adherence or missed doses? : no problems identfied  What is the estimated medication adherence level?: %  Based on the patient/caregiver response and refill history, does this patient require an MTP to track adherence improvements?: no    • Ongoing or New Barriers to Patient Self-Administration: None   • Methods for Supporting Patient Self-Administration: N/A     Medication Therapy Recommendations  No medication therapy recommendations to display    Goals of Therapy  Goals related to the patient's specialty therapy were discussed with the patient. The Patient Goals segment of this outreach has been reviewed and updated.   Goals     •  Specialty Pharmacy General Goal (pt-stated)       Pt stated: Take medication daily as prescribed       •  Specialty Pharmacy General Goal       Clinical Target: Maintain A1c 6-7%    7/19/22: 5.9%     1/4/23: 5.9 %             Quality of Life Assessment   Quality of Life related to the patient's enrollment in the patient management program and services provided was discussed with the patient. The QOL segment of this outreach has been reviewed and updated.    Quality of Life Improvement Scale: Somewhat better    Reassessment Plan & Follow-Up  1. Medication Therapy Changes: Continue Current Treatment Plan. No Changes Today.   2. Additional Plans, Therapy Recommendations, or Therapy Problems to Be Addressed: None at This Time.   3. Pharmacist to perform regular assessments no more than (6) months from the previous assessment.  4. Care Coordinator to set up future refill outreaches, coordinate prescription delivery, and escalate clinical questions to pharmacist.    Attestation  I attest that the specialty medications addressed above are appropriate for the patient based on my reassessment. If the prescribed therapy is at any point deemed not appropriate based on the current or future  assessments, a consultation will be initiated with the patient's specialty care provider to determine the best course of action. The revised plan of therapy will be documented along with any required assessments and/or additional patient education provided.     Montana RiversD, BCACP  Clinical Specialty Pharmacist, Endocrinology  1/4/2023  10:36 EST

## 2023-02-14 ENCOUNTER — SPECIALTY PHARMACY (OUTPATIENT)
Dept: ENDOCRINOLOGY | Facility: CLINIC | Age: 43
End: 2023-02-14
Payer: COMMERCIAL

## 2023-02-14 NOTE — PROGRESS NOTES
Specialty Pharmacy Patient Management Program  Endocrinology Refill Outreach      Inocencio is a 42 y.o. male contacted today regarding refills of his medication(s).    Specialty medication(s) and dose(s) confirmed: Trulicity, Xigduo  Other medication(s) being refilled: None    Refill Questions    Flowsheet Row Most Recent Value   Changes to allergies? No   Changes to medications? No   New conditions since last clinic visit No   Unplanned office visit, urgent care, ED, or hospital admission in the last 4 weeks  No   How does patient/caregiver feel medication is working? Very good   Financial problems or insurance changes  No   Since the previous refill, were any specialty medication doses or scheduled injections missed or delayed?  No   Does this patient require a clinical escalation to a pharmacist? No          Delivery Questions    Flowsheet Row Most Recent Value   Delivery method FedEx   Delivery address correct? Yes   Delivery phone number [phone]   Preferred delivery time? Anytime   Number of medications in delivery 2   Medication being filled and delivered TrJaci antoinegduo XR   Is there any medication that is due not being filled? No   Supplies needed? No supplies needed   Cooler needed? Yes   Do any medications need mixed or dated? No   Copay form of payment Credit card on file   Additional comments Copay $25   Questions or concerns for the pharmacist? No   Are any medications first time fills? No   Shipment status Cooler packed, Delivery complete                Follow-Up: 3/14 Refill OutreachMontana MontemayorD, MPH  Clinical Specialty Pharmacist, Endocrinology  2/14/2023  16:19 EST

## 2023-03-14 ENCOUNTER — SPECIALTY PHARMACY (OUTPATIENT)
Dept: ENDOCRINOLOGY | Facility: CLINIC | Age: 43
End: 2023-03-14
Payer: COMMERCIAL

## 2023-03-14 NOTE — PROGRESS NOTES
Specialty Pharmacy Patient Management Program  Endocrinology Refill Outreach      Inocencio is a 43 y.o. male contacted today regarding refills of his medication(s).    Specialty medication(s) and dose(s) confirmed: Xigduo and Trulicity   Other medications being refilled: Gloria Sensors     Refill Questions    Flowsheet Row Most Recent Value   Changes to allergies? No   Changes to medications? No   New conditions since last clinic visit No   Unplanned office visit, urgent care, ED, or hospital admission in the last 4 weeks  No   How does patient/caregiver feel medication is working? Very good   Financial problems or insurance changes  No   Since the previous refill, were any specialty medication doses or scheduled injections missed or delayed?  No   Does this patient require a clinical escalation to a pharmacist? No          Delivery Questions    Flowsheet Row Most Recent Value   Delivery method FedEx   Delivery address correct? No   Delivery phone number [phone]   Preferred delivery time? Anytime   Number of medications in delivery 3   Medication being filled and delivered Trulicity, Xigduo, Gloria Sensors   Is there any medication that is due not being filled? No   Supplies needed? No supplies needed   Cooler needed? Yes   Do any medications need mixed or dated? No   Copay form of payment Credit card on file   Additional comments Copay $25   Questions or concerns for the pharmacist? No   Are any medications first time fills? No   Shipment status Cooler packed, Delivery complete                 Follow-up: 28 Days      Anika Isbell, Care Coordinator   Endocrinology  3/14/2023  10:00 EDT

## 2023-04-05 ENCOUNTER — SPECIALTY PHARMACY (OUTPATIENT)
Dept: ENDOCRINOLOGY | Facility: CLINIC | Age: 43
End: 2023-04-05
Payer: COMMERCIAL

## 2023-04-05 NOTE — PROGRESS NOTES
Specialty Pharmacy Patient Management Program  Endocrinology Refill Outreach      Inocencio is a 43 y.o. male contacted today regarding refills of his medication(s).    Specialty medication(s) and dose(s) confirmed: Xigduo and Toujeo  Other medications being refilled: na    Refill Questions    Flowsheet Row Most Recent Value   Changes to allergies? No   Changes to medications? Yes   [Patient was recently seen by his PCP, and they changed his medication. His Trulicity was stopped, and they started him on Mounjaro. Pt currently gets this at [facility], and doesn't want to transfer it at this time.]   New conditions since last clinic visit No   Unplanned office visit, urgent care, ED, or hospital admission in the last 4 weeks  No   How does patient/caregiver feel medication is working? Very good   Financial problems or insurance changes  No   Since the previous refill, were any specialty medication doses or scheduled injections missed or delayed?  No   Does this patient require a clinical escalation to a pharmacist? Yes   [Patient was recently seen by his PCP, and they changed his medication. His Trulicity was stopped, and they started him on Mounjaro. Pt currently gets this at [facility], and doesn't want to transfer it at this time.]          Delivery Questions    Flowsheet Row Most Recent Value   Delivery method FedEx   Delivery address correct? Yes   Delivery phone number [phone]   Preferred delivery time? Anytime   Number of medications in delivery 2   Medication being filled and delivered Xigduo and Toujeo   Is there any medication that is due not being filled? No   Supplies needed? No supplies needed   Cooler needed? Yes   Do any medications need mixed or dated? No   Copay form of payment Credit card on file   Additional comments Copay $10   Questions or concerns for the pharmacist? No   Are any medications first time fills? No   Shipment status Cooler packed, Delivery complete                 Follow-up: 30  Morteza Isbell, Care Coordinator   Endocrinology  4/5/2023  11:32 EDT

## 2023-04-07 ENCOUNTER — TELEPHONE (OUTPATIENT)
Dept: ENDOCRINOLOGY | Facility: CLINIC | Age: 43
End: 2023-04-07
Payer: COMMERCIAL

## 2023-04-07 NOTE — TELEPHONE ENCOUNTER
Specialty Pharmacy Patient Management Program  One-Time Clinical Outreach     FYI:    Inocencio Hicks is a 43 y.o. male with Type 2 Diabetes seen by an Endocrinology provider and enrolled in the Endocrinology Patient Management program offered by [facility].      A one-time clinical outreach was conducted for refills when it was determined that pt was recently seen by his PCP, who changed him from Trulicity to Mounjaro at that time.     Of note, our pharmacy will not be managing pt's Mounjaro as he opted to fill at [facility] and does not want to transfer it at this time.      Geovanni Helms, PharmD, MPH  Clinical Specialty Pharmacist, Endocrinology  4/7/2023  09:51 EDT

## 2023-05-12 ENCOUNTER — SPECIALTY PHARMACY (OUTPATIENT)
Dept: ENDOCRINOLOGY | Facility: CLINIC | Age: 43
End: 2023-05-12
Payer: COMMERCIAL

## 2023-05-12 NOTE — PROGRESS NOTES
Specialty Pharmacy Patient Management Program  Endocrinology Refill Outreach      Inocencio is a 43 y.o. male contacted today regarding refills of his medication(s).    Specialty medication(s) and dose(s) confirmed: Xigduo  Other medications being refilled: na    Refill Questions    Flowsheet Row Most Recent Value   Changes to allergies? No   Changes to medications? No   New conditions since last clinic visit No   Unplanned office visit, urgent care, ED, or hospital admission in the last 4 weeks  No   How does patient/caregiver feel medication is working? Very good   Financial problems or insurance changes  No   Since the previous refill, were any specialty medication doses or scheduled injections missed or delayed?  No   Does this patient require a clinical escalation to a pharmacist? No          Delivery Questions    Flowsheet Row Most Recent Value   Delivery method FedEx   Delivery address correct? Yes   Delivery phone number [phone]   Preferred delivery time? Anytime   Number of medications in delivery 1   Medication being filled and delivered Xigduo   Is there any medication that is due not being filled? No   Supplies needed? No supplies needed   Cooler needed? No   Do any medications need mixed or dated? No   Copay form of payment Credit card on file   Additional comments Copay $0   Questions or concerns for the pharmacist? No   Are any medications first time fills? No   Shipment status Delivery complete                 Follow-up: 30 Days      Anika Isbell, Care Coordinator   Endocrinology  5/12/2023  12:20 EDT

## 2023-06-06 ENCOUNTER — SPECIALTY PHARMACY (OUTPATIENT)
Dept: PHARMACY | Facility: HOSPITAL | Age: 43
End: 2023-06-06
Payer: COMMERCIAL

## 2023-06-06 NOTE — PROGRESS NOTES
Specialty Pharmacy Patient Management Program  Endocrinology Refill Outreach      Inocencio is a 43 y.o. male contacted today regarding refills of his medication(s).    Specialty medication(s) and dose(s) confirmed: Xigduo, Toujeo  Other medications being refilled: Gloria Sensors     Refill Questions      Flowsheet Row Most Recent Value   Changes to allergies? No   Changes to medications? No   New conditions since last clinic visit No   Unplanned office visit, urgent care, ED, or hospital admission in the last 4 weeks  No   How does patient/caregiver feel medication is working? Very good   Financial problems or insurance changes  No   Since the previous refill, were any specialty medication doses or scheduled injections missed or delayed?  No   Does this patient require a clinical escalation to a pharmacist? No            Delivery Questions      Flowsheet Row Most Recent Value   Delivery method FedEx   Delivery address correct? Yes   Delivery phone number [phone]   Preferred delivery time? Anytime   Number of medications in delivery 3   Medication being filled and delivered Gloria Sensors, Toujeo Max, Xigduo   Is there any medication that is due not being filled? No   Supplies needed? No supplies needed   Cooler needed? Yes   Do any medications need mixed or dated? No   Copay form of payment Credit card on file   Additional comments Copay $10   Questions or concerns for the pharmacist? No   Are any medications first time fills? No   Shipment status Cooler packed, Delivery complete                   Follow-up: 28 Days      Anika Isbell, Care Coordinator   Endocrinology  6/6/2023  15:50 EDT

## 2023-08-08 ENCOUNTER — SPECIALTY PHARMACY (OUTPATIENT)
Dept: ENDOCRINOLOGY | Facility: CLINIC | Age: 43
End: 2023-08-08
Payer: COMMERCIAL

## 2023-08-08 NOTE — PROGRESS NOTES
Specialty Pharmacy Patient Management Program  Endocrinology Refill Outreach      Inocencio is a 43 y.o. male contacted today regarding refills of his medication(s).    Specialty medication(s) and dose(s) confirmed: Mounjaro, Xigduo   Other medications being refilled: na    Refill Questions      Flowsheet Row Most Recent Value   Changes to allergies? No   Changes to medications? No   New conditions since last clinic visit No   Unplanned office visit, urgent care, ED, or hospital admission in the last 4 weeks  No   How does patient/caregiver feel medication is working? Very good   Financial problems or insurance changes  No   Since the previous refill, were any specialty medication doses or scheduled injections missed or delayed?  No   Does this patient require a clinical escalation to a pharmacist? No            Delivery Questions      Flowsheet Row Most Recent Value   Delivery method FedEx   Delivery address correct? Yes   Delivery phone number [phone]   Preferred delivery time? Anytime   Number of medications in delivery 2   Medication being filled and delivered Mounjaro, Xigduo   Is there any medication that is due not being filled? No   Supplies needed? No supplies needed   Cooler needed? Yes   Do any medications need mixed or dated? No   Copay form of payment Credit card on file   Additional comments Copay $25   Questions or concerns for the pharmacist? No   Are any medications first time fills? No   Shipment status Cooler packed, Delivery complete                   Follow-up: 28 Days Mounjaro, 30 Days Ashwin Isbell, Care Coordinator   Endocrinology  8/8/2023  10:38 EDT

## 2023-08-28 ENCOUNTER — SPECIALTY PHARMACY (OUTPATIENT)
Dept: ENDOCRINOLOGY | Facility: CLINIC | Age: 43
End: 2023-08-28
Payer: COMMERCIAL

## 2023-08-28 RX ORDER — INSULIN GLARGINE 300 U/ML
50 INJECTION, SOLUTION SUBCUTANEOUS DAILY
Qty: 18 ML | Refills: 3 | Status: SHIPPED | OUTPATIENT
Start: 2023-08-28

## 2023-08-28 NOTE — PROGRESS NOTES
Specialty Pharmacy Patient Management Program  Endocrinology Refill Outreach      Inocencio is a 43 y.o. male contacted today regarding refills of his medication(s).    Specialty medication(s) and dose(s) confirmed: Toujeo Max  Other medications being refilled: Gloria Sensors     Refill Questions      Flowsheet Row Most Recent Value   Changes to allergies? No   Changes to medications? No   New conditions since last clinic visit No   Unplanned office visit, urgent care, ED, or hospital admission in the last 4 weeks  No   How does patient/caregiver feel medication is working? Very good   Financial problems or insurance changes  No   Since the previous refill, were any specialty medication doses or scheduled injections missed or delayed?  No   Does this patient require a clinical escalation to a pharmacist? No            Delivery Questions      Flowsheet Row Most Recent Value   Delivery method FedEx   Delivery address correct? Yes   Delivery phone number [phone]   Preferred delivery time? Anytime   Number of medications in delivery 2   Medication being filled and delivered Annalisa Alatorre   Is there any medication that is due not being filled? No   Supplies needed? No supplies needed   Cooler needed? Yes   Do any medications need mixed or dated? No   Copay form of payment Credit card on file   Additional comments Copay Est. $10   Questions or concerns for the pharmacist? No   Are any medications first time fills? No   Shipment status Cooler packed, Delivery complete                   Follow-up: 72 Days Toujeo, 84 Days Annalisa Isbell, Care Coordinator   Endocrinology  8/28/2023  14:12 EDT

## 2023-09-07 ENCOUNTER — SPECIALTY PHARMACY (OUTPATIENT)
Dept: ENDOCRINOLOGY | Facility: CLINIC | Age: 43
End: 2023-09-07
Payer: COMMERCIAL

## 2023-09-07 NOTE — PROGRESS NOTES
Specialty Pharmacy Patient Management Program  Endocrinology Refill Outreach      Inocencio is a 43 y.o. male contacted today regarding refills of his medication(s).    Specialty medication(s) and dose(s) confirmed: Mounjaro, Xigduo  Other medication(s) being refilled: None    Refill Questions      Flowsheet Row Most Recent Value   Changes to allergies? No   Changes to medications? No   New conditions since last clinic visit No   Unplanned office visit, urgent care, ED, or hospital admission in the last 4 weeks  No   How does patient/caregiver feel medication is working? Very good   Financial problems or insurance changes  No   Since the previous refill, were any specialty medication doses or scheduled injections missed or delayed?  No   Does this patient require a clinical escalation to a pharmacist? No          Delivery Questions      Flowsheet Row Most Recent Value   Delivery method FedEx   Delivery address correct? Yes   Delivery phone number [phone]   Number of medications in delivery 2   Medication being filled and delivered Mounjaro, Xigduo   Is there any medication that is due not being filled? No   Supplies needed? No supplies needed   Cooler needed? Yes   Do any medications need mixed or dated? No   Copay form of payment Credit card on file   Additional comments Copay $25   Questions or concerns for the pharmacist? No   Are any medications first time fills? No   Shipment status Cooler packed, Delivery complete                Follow-Up: 28d      Geovanni Helms, PharmD, MPH  Clinical Specialty Pharmacist, Endocrinology  9/7/2023  16:45 EDT

## 2023-10-06 ENCOUNTER — SPECIALTY PHARMACY (OUTPATIENT)
Dept: ENDOCRINOLOGY | Facility: CLINIC | Age: 43
End: 2023-10-06
Payer: COMMERCIAL

## 2023-10-06 NOTE — PROGRESS NOTES
Specialty Pharmacy Patient Management Program  Endocrinology Refill Outreach      Inocencio is a 43 y.o. male contacted today regarding refills of his medication(s).    Specialty medication(s) and dose(s) confirmed: Xigduo, Mounjaro  Other medications being refilled: Gloria Fang     Refill Questions      Flowsheet Row Most Recent Value   Changes to allergies? No   Changes to medications? No   New conditions since last clinic visit No   Unplanned office visit, urgent care, ED, or hospital admission in the last 4 weeks  No   How does patient/caregiver feel medication is working? Very good   Financial problems or insurance changes  No   Since the previous refill, were any specialty medication doses or scheduled injections missed or delayed?  No   Does this patient require a clinical escalation to a pharmacist? No            Delivery Questions      Flowsheet Row Most Recent Value   Delivery method [employer]Ex   Delivery address correct? Yes   Delivery phone number [phone]   Preferred delivery time? Anytime   Number of medications in delivery 3   Medication being filled and delivered Xigduo, Mounjaro, Annalisa   Is there any medication that is due not being filled? No   Supplies needed? No supplies needed   Cooler needed? Yes   Do any medications need mixed or dated? No   Copay form of payment Credit card on file   Additional comments Copay $25   Questions or concerns for the pharmacist? No   Are any medications first time fills? No   Shipment status Cooler packed, Delivery complete                   Follow-up: 28 Days, Mounjaro, 30 Days, Xigduo, 84 Days, Annalisa Isbell, Care Coordinator   Endocrinology  10/6/2023  15:15 EDT

## 2023-11-06 ENCOUNTER — SPECIALTY PHARMACY (OUTPATIENT)
Dept: ENDOCRINOLOGY | Facility: CLINIC | Age: 43
End: 2023-11-06
Payer: COMMERCIAL

## 2023-11-06 NOTE — PROGRESS NOTES
Specialty Pharmacy Patient Management Program  Endocrinology Refill Outreach      Inocencio is a 43 y.o. male contacted today regarding refills of his medication(s).    Specialty medication(s) and dose(s) confirmed: Mounjaro, Xigduo   Other medications being refilled: seferino    Refill Questions      Flowsheet Row Most Recent Value   Changes to allergies? No   Changes to medications? No   New conditions since last clinic visit No   Unplanned office visit, urgent care, ED, or hospital admission in the last 4 weeks  No   How does patient/caregiver feel medication is working? Very good   Financial problems or insurance changes  No   Since the previous refill, were any specialty medication doses or scheduled injections missed or delayed?  No   Does this patient require a clinical escalation to a pharmacist? No            Delivery Questions      Flowsheet Row Most Recent Value   Delivery method FedEx   Delivery address correct? Yes   Delivery phone number [phone]   Preferred delivery time? Anytime   Number of medications in delivery 2   Medication(s) being filled and delivered Dapagliflozin Prop-Metformin, Tirzepatide   Is there any medication that is due not being filled? No   Supplies needed? No supplies needed   Cooler needed? Yes   Do any medications need mixed or dated? No   Copay form of payment Credit card on file   Additional comments Copay $25   Questions or concerns for the pharmacist? No   Are any medications first time fills? No   Shipment status Cooler packed, Delivery complete                   Follow-up: 28 Days, Mounjaro, 30 Days, Ashwin Isbell, Care Coordinator   Endocrinology  11/6/2023  11:46 EST

## 2023-11-21 ENCOUNTER — OFFICE VISIT (OUTPATIENT)
Dept: ENDOCRINOLOGY | Facility: CLINIC | Age: 43
End: 2023-11-21
Payer: COMMERCIAL

## 2023-11-21 ENCOUNTER — SPECIALTY PHARMACY (OUTPATIENT)
Dept: ENDOCRINOLOGY | Facility: CLINIC | Age: 43
End: 2023-11-21
Payer: COMMERCIAL

## 2023-11-21 VITALS
HEIGHT: 72 IN | BODY MASS INDEX: 35.21 KG/M2 | SYSTOLIC BLOOD PRESSURE: 114 MMHG | DIASTOLIC BLOOD PRESSURE: 68 MMHG | HEART RATE: 82 BPM | WEIGHT: 260 LBS | OXYGEN SATURATION: 97 %

## 2023-11-21 DIAGNOSIS — E78.2 MIXED HYPERLIPIDEMIA: ICD-10-CM

## 2023-11-21 DIAGNOSIS — I10 BENIGN HYPERTENSION: ICD-10-CM

## 2023-11-21 DIAGNOSIS — E11.42 TYPE 2 DIABETES MELLITUS WITH DIABETIC POLYNEUROPATHY, WITH LONG-TERM CURRENT USE OF INSULIN: ICD-10-CM

## 2023-11-21 DIAGNOSIS — Z79.4 TYPE 2 DIABETES MELLITUS WITH DIABETIC POLYNEUROPATHY, WITH LONG-TERM CURRENT USE OF INSULIN: ICD-10-CM

## 2023-11-21 DIAGNOSIS — E11.65 UNCONTROLLED TYPE 2 DIABETES MELLITUS WITH HYPERGLYCEMIA: Primary | ICD-10-CM

## 2023-11-21 LAB
EXPIRATION DATE: NORMAL
GLUCOSE BLDC GLUCOMTR-MCNC: 96 MG/DL (ref 70–130)
Lab: NORMAL

## 2023-11-21 PROCEDURE — 95251 CONT GLUC MNTR ANALYSIS I&R: CPT | Performed by: INTERNAL MEDICINE

## 2023-11-21 PROCEDURE — 99214 OFFICE O/P EST MOD 30 MIN: CPT | Performed by: INTERNAL MEDICINE

## 2023-11-21 NOTE — PROGRESS NOTES
"     Office Note      Date: 2023  Patient Name: Inocencio Hicks  MRN: [id number]  : 1980    Chief Complaint   Patient presents with    Diabetes     Uncontrolled type 2 diabetes mellitus with hyperglycemia         History of Present Illness:   Inocencio Hicks is a 43 y.o. male who presents for Diabetes type 2. Diagnosed in: . Treated in past with oral agents. Current treatments: farxiga, metformin, mounjaro and basal insulin. Number of insulin shots per day: 1. Checks blood sugar 288x per day - on FreeStyle Gloria. Has low blood sugar: occasional. Aspirin use: No - no indication. Statin use: Yes. ACE-I/ARB use: Yes. Changes in health since last visit: none. Last eye exam 10/2023.      Subjective      Diabetic Complications:  Eyes: No  Kidneys: No  Feet: Yes - on cymbalta  Heart: No    Diet and Exercise:  Meals per day: 3  Minutes of exercise per week: 0 mins.    Review of Systems:   Review of Systems   Constitutional: Negative.    Cardiovascular: Negative.    Gastrointestinal: Negative.    Endocrine: Negative.        The following portions of the patient's history were reviewed and updated as appropriate: allergies, current medications, past family history, past medical history, past social history, past surgical history, and problem list.    Objective     Visit Vitals  /68 (BP Location: Right arm, Patient Position: Sitting, Cuff Size: Adult)   Pulse 82   Ht 182.9 cm (72\")   Wt 118 kg (260 lb)   SpO2 97%   BMI 35.26 kg/m²       Physical Exam:  Physical Exam  Constitutional:       Appearance: Normal appearance.   Neurological:      Mental Status: He is alert.         Labs:    HbA1c  Lab Results   Component Value Date    HGBA1C 5.9 2023       CMP  Lab Results   Component Value Date    GLUCOSE 80 2023    BUN 13 2023    CREATININE 0.79 2023    BCR 16.5 2023    K 4.1 2023    CO2 26.0 2023    CALCIUM 9.0 2023    AST 17 2023    ALT 21 2023 "        Lipid Panel  Lab Results   Component Value Date    HDL 34 (L) 06/21/2023    LDL 87 06/21/2023    TRIG 54 06/21/2023        TSH  Lab Results   Component Value Date    TSH 0.631 06/21/2023        Hemoglobin A1C  Lab Results   Component Value Date    HGBA1C 5.9 06/21/2023        Microalbumin/Creatinine  Lab Results   Component Value Date    MALBCRERATIO  06/21/2023      Comment:      Unable to calculate    MICROALBUR <1.2 06/21/2023           Assessment / Plan      Assessment & Plan:  Diagnoses and all orders for this visit:    1. Uncontrolled type 2 diabetes mellitus with hyperglycemia (Primary)  Assessment & Plan:  Diabetes is improving with treatment.   Continue current treatment regimen.  But decrease insulin.  Diabetes will be reassessed in 3 months.    FreeStyle Gloria 3 CGM was downloaded today.  Data was reviewed from 11/8/23 to 11/21/23.  This showed some nocturnal mild hypoglycemia.  Some mild but expected postprandial increase in glucose.  Encouraged him to decrease basal insulin.    Orders:  -     Cancel: POC Glycosylated Hemoglobin (Hb A1C)  -     POC Glucose, Blood    2. Benign hypertension  Assessment & Plan:  Hypertension is unchanged.  Continue current treatment regimen.  Blood pressure will be reassessed at the next regular appointment.      3. Mixed hyperlipidemia  Assessment & Plan:  Continue statin.      4. Type 2 diabetes mellitus with diabetic polyneuropathy, with long-term current use of insulin  Assessment & Plan:  Cymbalta seems to be working well.        Current Outpatient Medications   Medication Instructions    albuterol sulfate  (90 Base) MCG/ACT inhaler 2 puffs, Inhalation, As Needed    Aspirin 81 MG capsule Oral    celecoxib (CELEBREX) 200 mg, Oral, Daily    Continuous Blood Gluc Sensor (FreeStyle Gloria 3 Sensor) misc Change sensor Every 14 (Fourteen) Days, as directed.    dapagliflozin-metformin HCl ER (Xigduo XR) 5-1000 MG tablet 2 tablets, Oral, Daily    DULoxetine  (CYMBALTA) 60 mg, Oral, Daily    fexofenadine (ALLEGRA) 180 mg, Oral, Daily    glucose blood (FreeStyle Precision Derrek Test) test strip Use with Gloria 2 [place] to Test Glucose When Needed (if Sensor is Not Working)    Insulin Pen Needle (B-D UF III MINI PEN NEEDLES) 31G X 5 MM misc Use as directed for insulin admininstration    Insulin Pen Needle (Pen Needles) 31G X 5 MM misc Use as directed    lisinopril (PRINIVIL,ZESTRIL) 40 mg, Oral, Daily, Pt taking #2 20mg tablets once daily for 40mg daily dose    Mounjaro 10 mg, Subcutaneous, Every 7 Days    moxifloxacin (VIGAMOX) 0.5 % ophthalmic solution No dose, route, or frequency recorded.    omeprazole (PRILOSEC) 40 mg, Oral, Daily    simvastatin (ZOCOR) 20 mg, Oral    SUMAtriptan (IMITREX) 100 mg, Oral, As Needed    Toujeo Max SoloStar 50 Units, Subcutaneous, Daily    vitamin D (ERGOCALCIFEROL) 50,000 Units, Oral, Weekly      Return in about 3 months (around 2/21/2024) for Recheck with A1c.    Brcie Moura MD   11/21/2023

## 2023-11-21 NOTE — ASSESSMENT & PLAN NOTE
Diabetes is improving with treatment.   Continue current treatment regimen.  But decrease insulin.  Diabetes will be reassessed in 3 months.    FreeStyle Gloria 3 CGM was downloaded today.  Data was reviewed from 11/8/23 to 11/21/23.  This showed some nocturnal mild hypoglycemia.  Some mild but expected postprandial increase in glucose.  Encouraged him to decrease basal insulin.

## 2023-11-21 NOTE — PROGRESS NOTES
"   Specialty Pharmacy Patient Management Program  Endocrinology Reassessment     Inocencio Hicks was referred by an Endocrinology provider to the Endocrinology Patient Management program offered by [facility] for Type 2 Diabetes. A follow-up outreach was conducted, including assessment of continued therapy appropriateness, medication adherence, and side effect incidence and management for Mounjaro, Toujeo, and Xigduo.    Changes to Insurance Coverage or Financial Support  No changes at this time    Relevant Past Medical History and Comorbidities  Relevant medical history and concomitant health conditions were discussed with the patient. The patient's chart has been reviewed for relevant past medical history and comorbid health conditions and updated as necessary.   Past Medical History:   Diagnosis Date    Diabetes mellitus     Hyperlipidemia     Hypertension     Type 2 diabetes mellitus      Social History     Socioeconomic History    Marital status:    Tobacco Use    Smoking status: Former     Types: Cigarettes     Quit date: 2010     Years since quittin.9    Smokeless tobacco: Never   Vaping Use    Vaping Use: Never used   Substance and Sexual Activity    Alcohol use: Yes     Comment: rare    Drug use: Not Currently    Sexual activity: Defer     Problem list reviewed by Geovanni Helms, PharmD on 2023 at  4:51 PM    Hospitalizations and Urgent Care Since Last Assessment  ED Visits, Admissions, or Hospitalizations: None  Urgent Office Visits: None    Allergies  Known allergies and reactions were discussed with the patient. The patient's chart has been reviewed for allergy information and updated as necessary.   Allergies   Allergen Reactions    Byetta 10 Mcg Pen [Exenatide] Nausea Only     Reports with Byetta and Pratibha     Victoza [Liraglutide] Nausea And Vomiting     \"sour stomach\"     Allergies reviewed by Geovanni Helms, PharmD on 2023 at  4:51 " PM    Relevant Laboratory Values  Relevant laboratory values were discussed with the patient. The following specialty medication dose adjustment(s) are recommended: Last A1c <5%.  Pt instructed to slowly down on Toujeo based on fasting levels.   A1C Last 3 Results          1/4/2023    09:35 6/21/2023    09:00 11/14/2023    00:00   HGBA1C Last 3 Results   Hemoglobin A1C 5.9  5.9  4.9          Details          This result is from an external source.             Lab Results   Component Value Date    HGBA1C 4.9 11/14/2023     Lab Results   Component Value Date    GLUCOSE 80 06/21/2023    CALCIUM 9.0 06/21/2023     06/21/2023    K 4.1 06/21/2023    CO2 26.0 06/21/2023     06/21/2023    BUN 13 06/21/2023    CREATININE 0.79 06/21/2023    BCR 16.5 06/21/2023    ANIONGAP 8.0 06/21/2023     Lab Results   Component Value Date    CHOL 133 06/21/2023    TRIG 54 06/21/2023    HDL 34 (L) 06/21/2023    LDL 87 06/21/2023     Microalbumin          6/21/2023    09:14   Microalbumin   Microalbumin, Urine <1.2      Current Medication List  This medication list has been reviewed with the patient and evaluated for any interactions or necessary modifications/recommendations, and updated to include all prescription medications, OTC medications, and supplements the patient is currently taking.  This list reflects what is contained in the patient's profile, which has also been marked as reviewed to communicate to other providers it is the most up to date version of the patient's current medication therapy.     Current Outpatient Medications:     albuterol sulfate  (90 Base) MCG/ACT inhaler, Inhale 2 puffs As Needed., Disp: , Rfl:     Aspirin 81 MG capsule, Take  by mouth., Disp: , Rfl:     celecoxib (CeleBREX) 200 MG capsule, Take 1 capsule by mouth Daily., Disp: , Rfl:     Continuous Blood Gluc Sensor (FreeStyle Gloria 3 Sensor) misc, Change sensor Every 14 (Fourteen) Days, as directed., Disp: 6 each, Rfl: 3     dapagliflozin-metformin HCl ER (Xigduo XR) 5-1000 MG tablet, Take 2 tablets by mouth Daily., Disp: 180 tablet, Rfl: 3    DULoxetine (CYMBALTA) 60 MG capsule, Take 1 capsule by mouth Daily., Disp: , Rfl:     fexofenadine (ALLEGRA) 180 MG tablet, Take 1 tablet by mouth Daily., Disp: , Rfl:     glucose blood (FreeStyle Precision Derrek Test) test strip, Use with Gloria 2 [place] to Test Glucose When Needed (if Sensor is Not Working), Disp: 100 each, Rfl: 3    Insulin Glargine, 2 Unit Dial, (Toujeo Max SoloStar) 300 UNIT/ML solution pen-injector injection, Inject 50 Units under the skin into the appropriate area as directed Daily., Disp: 18 mL, Rfl: 3    Insulin Pen Needle (B-D UF III MINI PEN NEEDLES) 31G X 5 MM misc, Use as directed for insulin admininstration, Disp: 100 each, Rfl: 3    Insulin Pen Needle (Pen Needles) 31G X 5 MM misc, Use as directed, Disp: 100 each, Rfl: 11    lisinopril (PRINIVIL,ZESTRIL) 20 MG tablet, Take 2 tablets by mouth Daily. Pt taking #2 20mg tablets once daily for 40mg daily dose, Disp: , Rfl:     moxifloxacin (VIGAMOX) 0.5 % ophthalmic solution, , Disp: , Rfl:     omeprazole (priLOSEC) 20 MG capsule, Take 2 capsules by mouth Daily., Disp: , Rfl:     simvastatin (ZOCOR) 20 MG tablet, Take 20 mg by mouth., Disp: , Rfl:     SUMAtriptan (IMITREX) 100 MG tablet, Take 1 tablet by mouth As Needed., Disp: , Rfl:     Tirzepatide (Mounjaro) 10 MG/0.5ML solution pen-injector, Inject 0.5 mL under the skin into the appropriate area as directed Every 7 (Seven) Days., Disp: 6 mL, Rfl: 3    vitamin D (ERGOCALCIFEROL) 1.25 MG (16131 UT) capsule capsule, Take 1 capsule by mouth 1 (One) Time Per Week., Disp: , Rfl:     Medicines reviewed by Geovanni Helms, PharmD on 11/21/2023 at  4:51 PM    Drug Interactions  No Clinically Significant DDIs Were Identified at Present Time Upon Marking Medications Reviewed    Recommended Medications Assessment  Aspirin: Not Taking Currently  Statin: Currently Taking   ACEi/ARB:  Currently Taking     Adverse Drug Reactions  Medication tolerability: Tolerating with no to minimal ADRs  Medication plan: Continue therapy with normal follow-up  Plan for ADR Management: N/A at this time.  Pt reports he is tolerating well.    Adherence, Self-Administration, and Current Therapy Problems  Adherence related to the patient's specialty therapy was discussed with the patient. The Adherence segment of this outreach has been reviewed and updated.     Adherence Questions  Linked Medication(s) Assessed: Dapagliflozin Prop-Metformin, Insulin Glargine, Tirzepatide  On average, how many doses/injections does the patient miss per month?: 0  What are the identified reasons for non-adherence or missed doses? : no problems identified  What is the estimated medication adherence level?: %  Based on the patient/caregiver response and refill history, does this patient require an MTP to track adherence improvements?: no    Additional Barriers to Patient Self-Administration: No known barriers at this time.  Methods for Supporting Patient Self-Administration: Continued  enrollment    Open Medication Therapy Problems  No medication therapy recommendations to display    Goals of Therapy  Goals related to the patient's specialty therapy were discussed with the patient. The Patient Goals segment of this outreach has been reviewed and updated.   Goals Addressed Today        Specialty Pharmacy General Goal      Clinical Target: Maintain A1c 6-7%    7/19/22: 5.9%     1/4/23: 5.9 %     6/21/23: 5.9% - Unchanged from previous 2 visits.  Increasing Mounjaro to 10mg weekly.  On track.  CR    11/21/2023:  A1c 4.9% last month.  On track.  CR              Quality of Life Assessment   Quality of Life related to the patient's enrollment in the patient management program and services provided was discussed with the patient. The QOL segment of this outreach has been reviewed and updated.  Quality of Life Improvement Scale:  Significantly better    Reassessment Plan & Follow-Up  1. Medication Therapy Changes: Last A1c <5%.  Pt instructed to slowly down on Toujeo based on fasting levels.   2. Related Plans, Therapy Recommendations, or Issues to Be Addressed: Sending Toujeo at this time.  RO complete.  3. Pharmacist to perform regular assessments no more than (6) months from the previous assessment.  4. Care Coordinator to set up future refill outreaches, coordinate prescription delivery, and escalate clinical questions to pharmacist.    Attestation  Therapeutic appropriateness: Appropriate   I attest the patient was actively involved in and has agreed to the above plan of care.  If the prescribed therapy is at any point deemed not appropriate based on the current or future assessments, a consultation will be initiated with the patient's specialty care provider to determine the best course of action. The revised plan of therapy will be documented along with any required assessments and/or additional patient education provided.       Geovanni Helms, PharmD, MPH  Clinical Specialty Pharmacist, Endocrinology  11/21/2023  16:55 EST

## 2023-11-21 NOTE — PROGRESS NOTES
Specialty Pharmacy Patient Management Program  Endocrinology Refill Outreach      Inocencio is a 43 y.o. male contacted today regarding refills of his medication(s).    Specialty medication(s) and dose(s) confirmed: Toujeo  Other medication(s) being refilled: None    Refill Questions      Flowsheet Row Most Recent Value   Changes to allergies? No   Changes to medications? No   New conditions since last clinic visit No   Unplanned office visit, urgent care, ED, or hospital admission in the last 4 weeks  No   How does patient/caregiver feel medication is working? Very good   Financial problems or insurance changes  No   Since the previous refill, were any specialty medication doses or scheduled injections missed or delayed?  No   Does this patient require a clinical escalation to a pharmacist? No          Delivery Questions      Flowsheet Row Most Recent Value   Delivery method [employer]Ex   Delivery address correct? Yes   Delivery phone number [phone]   Preferred delivery time? Anytime   Number of medications in delivery 1   Medication(s) being filled and delivered Insulin Glargine   Is there any medication that is due not being filled? No   Supplies needed? No supplies needed   Cooler needed? Yes   Do any medications need mixed or dated? No   Copay form of payment Credit card on file   Additional comments Copay $10   Questions or concerns for the pharmacist? No   Are any medications first time fills? No                Follow-Up: Montana SimpsonD, MPH  Clinical Specialty Pharmacist, Endocrinology  11/21/2023  16:53 EST

## 2023-12-04 ENCOUNTER — SPECIALTY PHARMACY (OUTPATIENT)
Dept: ENDOCRINOLOGY | Facility: CLINIC | Age: 43
End: 2023-12-04
Payer: COMMERCIAL

## 2023-12-04 NOTE — PROGRESS NOTES
Specialty Pharmacy Patient Management Program  Endocrinology Refill Outreach      Inocencio is a 43 y.o. male contacted today regarding refills of his medication(s).    Specialty medication(s) and dose(s) confirmed: Mounjaro, Xigduo  Other medication(s) being refilled: None    Refill Questions      Flowsheet Row Most Recent Value   Changes to allergies? No   Changes to medications? No   New conditions since last clinic visit No   Unplanned office visit, urgent care, ED, or hospital admission in the last 4 weeks  No   How does patient/caregiver feel medication is working? Very good   Financial problems or insurance changes  No   Since the previous refill, were any specialty medication doses or scheduled injections missed or delayed?  No   Does this patient require a clinical escalation to a pharmacist? No          Delivery Questions      Flowsheet Row Most Recent Value   Delivery method [employer]Ex   Delivery address correct? Yes   Delivery phone number [phone]   Preferred delivery time? Anytime   Number of medications in delivery 2   Medication(s) being filled and delivered Dapagliflozin Prop-Metformin, Tirzepatide   Is there any medication that is due not being filled? No   Supplies needed? No supplies needed   Cooler needed? Yes   Do any medications need mixed or dated? No   Copay form of payment Credit card on file   Additional comments Copay $25   Questions or concerns for the pharmacist? No   Are any medications first time fills? No                Follow-Up: 28d      Geovanni Helms, PharmD, MPH  Clinical Specialty Pharmacist, Endocrinology  12/4/2023  14:55 EST

## 2023-12-21 RX ORDER — FLURBIPROFEN SODIUM 0.3 MG/ML
SOLUTION/ DROPS OPHTHALMIC
Qty: 100 EACH | Refills: 3 | Status: SHIPPED | OUTPATIENT
Start: 2023-12-21

## 2023-12-21 RX ORDER — DAPAGLIFLOZIN AND METFORMIN HYDROCHLORIDE 5; 1000 MG/1; MG/1
2 TABLET, FILM COATED, EXTENDED RELEASE ORAL DAILY
Qty: 180 TABLET | Refills: 3 | Status: SHIPPED | OUTPATIENT
Start: 2023-12-21

## 2023-12-21 NOTE — TELEPHONE ENCOUNTER
Specialty Pharmacy Patient Management Program  Prescription Refill Request     Patient currently fills medications at  Pharmacy. Needing refill(s) on the following:      Requested Prescriptions     Pending Prescriptions Disp Refills    dapagliflozin-metformin HCl ER (Xigduo XR) 5-1000 MG tablet 180 tablet 3     Sig: Take 2 tablets by mouth Daily.    Insulin Pen Needle (B-D UF III MINI PEN NEEDLES) 31G X 5 MM misc 100 each 3     Sig: Use as directed for insulin admininstration     Pended for endocrinology provider, Dr. Moura, to review and approve if appropriate.       Geovanni Helms, PharmD, MPH  Clinical Specialty Pharmacist, Endocrinology  12/21/2023  15:48 EST

## 2024-01-04 ENCOUNTER — SPECIALTY PHARMACY (OUTPATIENT)
Dept: ENDOCRINOLOGY | Facility: CLINIC | Age: 44
End: 2024-01-04
Payer: COMMERCIAL

## 2024-01-04 NOTE — PROGRESS NOTES
Specialty Pharmacy Patient Management Program  Endocrinology Refill Outreach      Inocencio is a 43 y.o. male contacted today regarding refills of his medication(s).    Specialty medication(s) and dose(s) confirmed: Mounjaro, Xigduo  Other medication(s) being refilled: None    Refill Questions      Flowsheet Row Most Recent Value   Changes to allergies? No   Changes to medications? No   New conditions or infections since last clinic visit No   Unplanned office visit, urgent care, ED, or hospital admission in the last 4 weeks  No   How does patient/caregiver feel medication is working? Very good   Financial problems or insurance changes  No   Since the previous refill, were any specialty medication doses or scheduled injections missed or delayed?  No   Does this patient require a clinical escalation to a pharmacist? No          Delivery Questions      Flowsheet Row Most Recent Value   Delivery method [employer]Ex   Delivery address verified with patient/caregiver? Yes   Delivery address Home   Number of medications in delivery 2   Medication(s) being filled and delivered Dapagliflozin Prop-Metformin, Tirzepatide   Copay verified? Yes   Copay amount $25   Copay form of payment HAS/FSA on file            Follow-Up: 28d      Geovanni Helms, PharmD, MPH  Clinical Specialty Pharmacist, Endocrinology  1/4/2024  13:33 EST

## 2024-02-06 ENCOUNTER — SPECIALTY PHARMACY (OUTPATIENT)
Dept: ENDOCRINOLOGY | Facility: CLINIC | Age: 44
End: 2024-02-06
Payer: COMMERCIAL

## 2024-02-06 NOTE — PROGRESS NOTES
Specialty Pharmacy Patient Management Program  Endocrinology Refill Outreach      Inocencio is a 43 y.o. male contacted today regarding refills of his medication(s).    Specialty medication(s) and dose(s) confirmed: xigduo, mounjaro, toujeo  Other medication(s) being refilled: pen needles    Refill Questions      Flowsheet Row Most Recent Value   Changes to allergies? No   Changes to medications? No   New conditions or infections since last clinic visit No   Unplanned office visit, urgent care, ED, or hospital admission in the last 4 weeks  No   How does patient/caregiver feel medication is working? Very good   Financial problems or insurance changes  No   Since the previous refill, were any specialty medication doses or scheduled injections missed or delayed?  No   Does this patient require a clinical escalation to a pharmacist? No          Delivery Questions      Flowsheet Row Most Recent Value   Delivery method FedEx   Delivery address verified with patient/caregiver? Yes   Delivery address Home   Number of medications in delivery 4   Medication(s) being filled and delivered Tirzepatide, Insulin Glargine, Dapagliflozin Prop-Metformin, Insulin Pen Needle   Doses left of specialty medications 1 week   Copay verified? Yes   Copay amount $35.00   Copay form of payment Credit/debit on file            Follow-Up: 28d mounjaro; 30d xigduo and pen needles; 72 milvia Tan CPhT  Pharmacy Care Coordinator, Endocrinology  2/6/2024  16:10 EST

## 2024-03-07 ENCOUNTER — SPECIALTY PHARMACY (OUTPATIENT)
Dept: ENDOCRINOLOGY | Facility: CLINIC | Age: 44
End: 2024-03-07
Payer: COMMERCIAL

## 2024-03-07 RX ORDER — BLOOD-GLUCOSE SENSOR
1 EACH MISCELLANEOUS
Qty: 6 EACH | Refills: 4 | Status: SHIPPED | OUTPATIENT
Start: 2024-03-07

## 2024-03-07 NOTE — PROGRESS NOTES
Specialty Pharmacy Patient Management Program  Endocrinology Refill Outreach      Inocencio is a 44 y.o. male contacted today regarding refills of his medication(s).    Specialty medication(s) and dose(s) confirmed: xigduo and mounjaro  Other medication(s) being refilled: simón sensors    Refill Questions      Flowsheet Row Most Recent Value   Changes to allergies? No   Changes to medications? No   New conditions or infections since last clinic visit No   Unplanned office visit, urgent care, ED, or hospital admission in the last 4 weeks  No   How does patient/caregiver feel medication is working? Very good   Financial problems or insurance changes  No   Since the previous refill, were any specialty medication doses or scheduled injections missed or delayed?  No   Does this patient require a clinical escalation to a pharmacist? No          Delivery Questions      Flowsheet Row Most Recent Value   Delivery method FedEx   Delivery address verified with patient/caregiver? Yes   Delivery address Home   Number of medications in delivery 3   Medication(s) being filled and delivered Dapagliflozin Prop-Metformin, Continuous Blood Gluc Sensor, Tirzepatide   Doses left of specialty medications 1 week   Copay verified? Yes   Copay amount Total: $25,  [employer] Offline on (DOS) SpRx ERX down. Confirmed expected copay(s) based on most recent unaffected claim(s) on (Date of Last Claim(s)):01/04/2024   Copay form of payment HAS/FSA on file            Follow-Up: 28d mounjaro and xigduo; 56d for simón Tan CPhT  Pharmacy Care Coordinator, Endocrinology  3/7/2024  08:44 EST

## 2024-03-07 NOTE — TELEPHONE ENCOUNTER
Specialty Pharmacy Patient Management Program  Prescription Refill Request     Patient currently fills medications at  Pharmacy. Needing refill(s) on the following:      Requested Prescriptions     Pending Prescriptions Disp Refills    Continuous Blood Gluc Sensor (FreeStyle Gloria 3 Sensor) misc 6 each 4     Sig: Change sensor Every 14 (Fourteen) Days, as directed.     Pended for endocrinology provider, Dr. Moura, to review and approve if appropriate.     Last Office Visit: 11/21/2023  Next Office Visit: 4/17/2024      Geovanni Helms, PharmD, MPH  Clinical Specialty Pharmacist, Endocrinology  3/7/2024  09:33 EST

## 2024-03-29 ENCOUNTER — SPECIALTY PHARMACY (OUTPATIENT)
Dept: ENDOCRINOLOGY | Facility: CLINIC | Age: 44
End: 2024-03-29
Payer: COMMERCIAL

## 2024-03-29 RX ORDER — DULAGLUTIDE 3 MG/.5ML
3 INJECTION, SOLUTION SUBCUTANEOUS WEEKLY
Qty: 6 ML | Refills: 3 | Status: SHIPPED | OUTPATIENT
Start: 2024-03-29

## 2024-03-29 NOTE — TELEPHONE ENCOUNTER
Specialty Pharmacy Patient Management Program  Prescription Refill Request     Patient currently fills medications at  Pharmacy.  All strengths Mounjaro currently on b/o, requesting temporary switch back to Trulicity in interim.    Requested Prescriptions     Pending Prescriptions Disp Refills    Dulaglutide (Trulicity) 3 MG/0.5ML solution pen-injector 6 mL 3     Sig: Inject 0.5 mL under the skin into the appropriate area as directed 1 (One) Time Per Week.     Pended for endocrinology provider, Dr. Moura, to review and approve if appropriate.       Geovanni Helms, PharmD, MPH  Clinical Specialty Pharmacist, Endocrinology  3/29/2024  16:34 EDT

## 2024-03-29 NOTE — PROGRESS NOTES
Specialty Pharmacy Patient Management Program  One-Time Clinical Outreach     Inocencio Hicks is a 44 y.o. male seen by an Endocrinology provider for Type 2 Diabetes and enrolled in the Endocrinology Patient Management program offered by [facility].      Switching patient back to Trulicity 3 mg from Mounjaro due to recent backorders and unavailability of any strength of Mounjaro.  As patient has filled Trulicity with us before, initial assessment and education has already been provided.  Will keep current reassessment cora.  Patient due for next appointment in     Lila Nguyen, PharmD, BCACP  Clinical Specialty Pharmacist, Endocrinology  3/29/2024  17:02 EDT

## 2024-04-04 ENCOUNTER — SPECIALTY PHARMACY (OUTPATIENT)
Dept: ENDOCRINOLOGY | Facility: CLINIC | Age: 44
End: 2024-04-04
Payer: COMMERCIAL

## 2024-04-04 NOTE — PROGRESS NOTES
Specialty Pharmacy Patient Management Program  Endocrinology Refill Outreach      Inocencio is a 44 y.o. male contacted today regarding refills of his medication(s).    Specialty medication(s) and dose(s) confirmed: trulicity, xigduo, toujeo  Other medication(s) being refilled: none    Refill Questions      Flowsheet Row Most Recent Value   Changes to allergies? No   Changes to medications? No   New conditions or infections since last clinic visit No   Unplanned office visit, urgent care, ED, or hospital admission in the last 4 weeks  No   How does patient/caregiver feel medication is working? Very good   Financial problems or insurance changes  No   Since the previous refill, were any specialty medication doses or scheduled injections missed or delayed?  No   Does this patient require a clinical escalation to a pharmacist? No          Delivery Questions      Flowsheet Row Most Recent Value   Delivery method FedEx   Delivery address verified with patient/caregiver? Yes   Delivery address Home   Number of medications in delivery 3   Medication(s) being filled and delivered Dulaglutide, Dapagliflozin Prop-Metformin, Insulin Glargine   Doses left of specialty medications 3 days   Copay verified? Yes   Copay amount $35   Copay form of payment HAS/FSA on file            Follow-Up: 28d; milvia 72d    Kya Tan CPhT  Pharmacy Care Coordinator, Endocrinology  4/4/2024  15:48 EDT

## 2024-04-17 ENCOUNTER — OFFICE VISIT (OUTPATIENT)
Dept: ENDOCRINOLOGY | Facility: CLINIC | Age: 44
End: 2024-04-17
Payer: COMMERCIAL

## 2024-04-17 VITALS
HEART RATE: 65 BPM | WEIGHT: 262 LBS | DIASTOLIC BLOOD PRESSURE: 68 MMHG | BODY MASS INDEX: 35.49 KG/M2 | OXYGEN SATURATION: 99 % | HEIGHT: 72 IN | SYSTOLIC BLOOD PRESSURE: 112 MMHG

## 2024-04-17 DIAGNOSIS — E11.42 TYPE 2 DIABETES MELLITUS WITH DIABETIC POLYNEUROPATHY, WITH LONG-TERM CURRENT USE OF INSULIN: ICD-10-CM

## 2024-04-17 DIAGNOSIS — Z79.4 TYPE 2 DIABETES MELLITUS WITH DIABETIC POLYNEUROPATHY, WITH LONG-TERM CURRENT USE OF INSULIN: ICD-10-CM

## 2024-04-17 DIAGNOSIS — Z79.4 TYPE 2 DIABETES MELLITUS WITH HYPERGLYCEMIA, WITH LONG-TERM CURRENT USE OF INSULIN: Primary | ICD-10-CM

## 2024-04-17 DIAGNOSIS — E11.65 TYPE 2 DIABETES MELLITUS WITH HYPERGLYCEMIA, WITH LONG-TERM CURRENT USE OF INSULIN: Primary | ICD-10-CM

## 2024-04-17 DIAGNOSIS — I10 BENIGN HYPERTENSION: ICD-10-CM

## 2024-04-17 DIAGNOSIS — E78.2 MIXED HYPERLIPIDEMIA: ICD-10-CM

## 2024-04-17 LAB
EXPIRATION DATE: NORMAL
EXPIRATION DATE: NORMAL
GLUCOSE BLDC GLUCOMTR-MCNC: 99 MG/DL (ref 70–130)
HBA1C MFR BLD: 5.5 % (ref 4.5–5.7)
Lab: NORMAL
Lab: NORMAL

## 2024-04-17 PROCEDURE — 99214 OFFICE O/P EST MOD 30 MIN: CPT | Performed by: INTERNAL MEDICINE

## 2024-04-17 PROCEDURE — 95251 CONT GLUC MNTR ANALYSIS I&R: CPT | Performed by: INTERNAL MEDICINE

## 2024-04-17 PROCEDURE — 83036 HEMOGLOBIN GLYCOSYLATED A1C: CPT | Performed by: INTERNAL MEDICINE

## 2024-04-17 NOTE — ASSESSMENT & PLAN NOTE
Diabetes is stable.   Continue current treatment regimen.  Diabetes will be reassessed in 6 months.    FreeStyle Gloria 3 CGM was downloaded today.  Data was reviewed from 4/4/24 to 4/17/24.  This showed good glucose control throughout the day with occ nocturnal mild hypoglycemia.  Rare postprandial spike.  Will decrease basal insulin a couple of units to avoid hypoglycemia.

## 2024-04-17 NOTE — PROGRESS NOTES
"     Office Note      Date: 2024  Patient Name: Inocencio Hicks  MRN: [id number]  : 1980    Chief Complaint   Patient presents with    Diabetes     Type II       History of Present Illness:   Inocencio Hicks is a 44 y.o. male who presents for Diabetes type 2. Diagnosed in: . Treated in past with oral agents. Current treatments: farxiga, metformin, trulicity and basal insulin. Number of insulin shots per day: 1. Checks blood sugar 288x per day - on FreeStyle Gloria 3. Has low blood sugar: occasional. Aspirin use: Yes. Statin use: Yes. ACE-I/ARB use: Yes. Changes in health since last visit: none. Last eye exam 10/2023.      Subjective      Diabetic Complications:  Eyes: No  Kidneys: No  Feet: Yes - on cymbalta  Heart: No    Diet and Exercise:  Meals per day: 3  Minutes of exercise per week: 0 mins.    Review of Systems:   Review of Systems   Constitutional: Negative.    Cardiovascular: Negative.    Gastrointestinal: Negative.    Endocrine: Negative.        The following portions of the patient's history were reviewed and updated as appropriate: allergies, current medications, past family history, past medical history, past social history, past surgical history, and problem list.    Objective     Visit Vitals  /68 (BP Location: Left arm, Patient Position: Sitting, Cuff Size: Adult)   Pulse 65   Ht 182.9 cm (72\")   Wt 119 kg (262 lb)   SpO2 99%   BMI 35.53 kg/m²       Physical Exam:  Physical Exam  Constitutional:       Appearance: Normal appearance.   Neurological:      Mental Status: He is alert.         Labs:    HbA1c  Lab Results   Component Value Date    HGBA1C 5.5 2024       CMP  Lab Results   Component Value Date    GLUCOSE 80 2023    BUN 13 2023    CREATININE 0.79 2023    BCR 16.5 2023    K 4.1 2023    CO2 26.0 2023    CALCIUM 9.0 2023    AST 17 2023    ALT 21 2023        Lipid Panel  Lab Results   Component Value Date    HDL 34 " (L) 06/21/2023    LDL 87 06/21/2023    TRIG 54 06/21/2023        TSH  Lab Results   Component Value Date    TSH 0.631 06/21/2023        Hemoglobin A1C  Lab Results   Component Value Date    HGBA1C 5.5 04/17/2024        Microalbumin/Creatinine  Lab Results   Component Value Date    RHIANNAO  06/21/2023      Comment:      Unable to calculate    MICROALBUR <1.2 06/21/2023           Assessment / Plan      Assessment & Plan:  Diagnoses and all orders for this visit:    1. Type 2 diabetes mellitus with hyperglycemia, with long-term current use of insulin (Primary)  Assessment & Plan:  Diabetes is stable.   Continue current treatment regimen.  Diabetes will be reassessed in 6 months.    FreeStyle Gloria 3 CGM was downloaded today.  Data was reviewed from 4/4/24 to 4/17/24.  This showed good glucose control throughout the day with occ nocturnal mild hypoglycemia.  Rare postprandial spike.  Will decrease basal insulin a couple of units to avoid hypoglycemia.    Orders:  -     POC Glucose, Blood  -     POC Glycosylated Hemoglobin (Hb A1C)    2. Benign hypertension  Assessment & Plan:  Hypertension is stable and controlled  Continue current treatment regimen.  Blood pressure will be reassessed in 6 months.      3. Mixed hyperlipidemia  Assessment & Plan:  Continue statin.  Plan to check lipids next visit.      4. Type 2 diabetes mellitus with diabetic polyneuropathy, with long-term current use of insulin      Current Outpatient Medications   Medication Instructions    albuterol sulfate  (90 Base) MCG/ACT inhaler 2 puffs, Inhalation, As Needed    Aspirin 81 MG capsule Oral    celecoxib (CELEBREX) 200 mg, Oral, Daily    Continuous Blood Gluc Sensor (FreeStyle Gloria 3 Sensor) misc Change sensor Every 14 (Fourteen) Days, as directed.    dapagliflozin-metformin HCl ER (Xigduo XR) 5-1000 MG tablet 2 tablets, Oral, Daily    DULoxetine (CYMBALTA) 60 mg, Oral, Daily    fexofenadine (ALLEGRA) 180 mg, Oral, Daily, PRN ONLY     glucose blood (FreeStyle Precision Derrek Test) test strip Use with Gloria 2 [place] to Test Glucose When Needed (if Sensor is Not Working)    Insulin Pen Needle (B-D UF III MINI PEN NEEDLES) 31G X 5 MM misc Use as directed for insulin admininstration    lisinopril (PRINIVIL,ZESTRIL) 40 mg, Oral, Daily, Pt taking #2 20mg tablets once daily for 40mg daily dose    Mounjaro 10 mg, Subcutaneous, Every 7 Days    omeprazole (PRILOSEC) 40 mg, Oral, Daily    simvastatin (ZOCOR) 20 mg, Oral    SUMAtriptan (IMITREX) 100 mg, Oral, As Needed    Toujeo Max SoloStar 50 Units, Subcutaneous, Daily    Trulicity 3 mg, Subcutaneous, Weekly    vitamin D (ERGOCALCIFEROL) 50,000 Units, Oral, Weekly      Return in about 3 months (around 7/17/2024) for Recheck with A1c, CMP, lipid, TSH, microalbumin, foot exam.    Electronically signed by: Brice Moura MD  04/17/2024

## 2024-04-18 ENCOUNTER — SPECIALTY PHARMACY (OUTPATIENT)
Dept: ENDOCRINOLOGY | Facility: CLINIC | Age: 44
End: 2024-04-18
Payer: COMMERCIAL

## 2024-04-18 NOTE — PROGRESS NOTES
"   Specialty Pharmacy Patient Management Program  Endocrinology Reassessment     Inocencio Hicks was referred by an Endocrinology provider to the Endocrinology Patient Management program offered by [facility] for Type 2 Diabetes. A follow-up outreach was conducted, including assessment of continued therapy appropriateness, medication adherence, and side effect incidence and management for Toujeo, Trulicity, and Xigduo.    Changes to Insurance Coverage or Financial Support  No changes at this time    Relevant Past Medical History and Comorbidities  Relevant medical history and concomitant health conditions were discussed with the patient. The patient's chart has been reviewed for relevant past medical history and comorbid health conditions and updated as necessary.   Past Medical History:   Diagnosis Date    Diabetes mellitus     Hyperlipidemia     Hypertension     Type 2 diabetes mellitus      Social History     Socioeconomic History    Marital status:    Tobacco Use    Smoking status: Former     Current packs/day: 0.00     Types: Cigarettes     Quit date: 2010     Years since quittin.3    Smokeless tobacco: Never   Vaping Use    Vaping status: Never Used   Substance and Sexual Activity    Alcohol use: Yes     Comment: rare    Drug use: Not Currently    Sexual activity: Defer     Problem list reviewed by Geovanni Helms, PharmD on 2024 at  9:57 AM    Hospitalizations and Urgent Care Since Last Assessment  ED Visits, Admissions, or Hospitalizations: None  Urgent Office Visits: None    Allergies  Known allergies and reactions were discussed with the patient. The patient's chart has been reviewed for allergy information and updated as necessary.   Allergies   Allergen Reactions    Byetta 10 Mcg Pen [Exenatide] Nausea Only     Reports with Byetta and Bydureon     Victoza [Liraglutide] Nausea And Vomiting     \"sour stomach\"     Allergies reviewed by Geovanni Helms, PharmD on " 4/18/2024 at  9:57 AM    Relevant Laboratory Values  Relevant laboratory values were discussed with the patient. The following specialty medication dose adjustment(s) are recommended: A1c increased to 5.5% from 4.9% during previous visit.  Still on track with A1c goal of <7%.  Will continue to follow and monitor.      A1C Last 3 Results          6/21/2023    09:00 11/14/2023    00:00 4/17/2024    15:29   HGBA1C Last 3 Results   Hemoglobin A1C 5.9  4.9     5.5       Details          This result is from an external source.             Lab Results   Component Value Date    HGBA1C 5.5 04/17/2024     Lab Results   Component Value Date    GLUCOSE 80 06/21/2023    CALCIUM 9.0 06/21/2023     06/21/2023    K 4.1 06/21/2023    CO2 26.0 06/21/2023     06/21/2023    BUN 13 06/21/2023    CREATININE 0.79 06/21/2023    BCR 16.5 06/21/2023    ANIONGAP 8.0 06/21/2023     Lab Results   Component Value Date    CHOL 133 06/21/2023    TRIG 54 06/21/2023    HDL 34 (L) 06/21/2023    LDL 87 06/21/2023     Microalbumin          6/21/2023    09:14   Microalbumin   Microalbumin, Urine <1.2      Current Medication List  This medication list has been reviewed with the patient and evaluated for any interactions or necessary modifications/recommendations, and updated to include all prescription medications, OTC medications, and supplements the patient is currently taking.  This list reflects what is contained in the patient's profile, which has also been marked as reviewed to communicate to other providers it is the most up to date version of the patient's current medication therapy.     Current Outpatient Medications:     albuterol sulfate  (90 Base) MCG/ACT inhaler, Inhale 2 puffs As Needed., Disp: , Rfl:     Aspirin 81 MG capsule, Take  by mouth., Disp: , Rfl:     celecoxib (CeleBREX) 200 MG capsule, Take 1 capsule by mouth Daily., Disp: , Rfl:     Continuous Blood Gluc Sensor (FreeStyle Gloria 3 Sensor) misc, Change sensor  Every 14 (Fourteen) Days, as directed., Disp: 6 each, Rfl: 4    dapagliflozin-metformin HCl ER (Xigduo XR) 5-1000 MG tablet, Take 2 tablets by mouth Daily., Disp: 180 tablet, Rfl: 3    Dulaglutide (Trulicity) 3 MG/0.5ML solution pen-injector, Inject 0.5 mL under the skin into the appropriate area as directed 1 (One) Time Per Week., Disp: 6 mL, Rfl: 3    DULoxetine (CYMBALTA) 60 MG capsule, Take 1 capsule by mouth Daily., Disp: , Rfl:     fexofenadine (ALLEGRA) 180 MG tablet, Take 1 tablet by mouth Daily. PRN ONLY, Disp: , Rfl:     glucose blood (FreeStyle Precision Derrek Test) test strip, Use with Gloria 2 [place] to Test Glucose When Needed (if Sensor is Not Working), Disp: 100 each, Rfl: 3    Insulin Glargine, 2 Unit Dial, (Toujeo Max SoloStar) 300 UNIT/ML solution pen-injector injection, Inject 50 Units under the skin into the appropriate area as directed Daily., Disp: 18 mL, Rfl: 3    Insulin Pen Needle (B-D UF III MINI PEN NEEDLES) 31G X 5 MM misc, Use as directed for insulin admininstration, Disp: 100 each, Rfl: 3    lisinopril (PRINIVIL,ZESTRIL) 20 MG tablet, Take 2 tablets by mouth Daily. Pt taking #2 20mg tablets once daily for 40mg daily dose, Disp: , Rfl:     omeprazole (priLOSEC) 20 MG capsule, Take 2 capsules by mouth Daily., Disp: , Rfl:     simvastatin (ZOCOR) 20 MG tablet, Take 20 mg by mouth., Disp: , Rfl:     SUMAtriptan (IMITREX) 100 MG tablet, Take 1 tablet by mouth As Needed., Disp: , Rfl:     Tirzepatide (Mounjaro) 10 MG/0.5ML solution pen-injector pen, Inject 0.5 mL under the skin into the appropriate area as directed Every 7 (Seven) Days. (Patient not taking: Reported on 4/17/2024), Disp: 6 mL, Rfl: 3    vitamin D (ERGOCALCIFEROL) 1.25 MG (84384 UT) capsule capsule, Take 1 capsule by mouth 1 (One) Time Per Week., Disp: , Rfl:     Medicines reviewed by Geovanni Helms, PharmD on 4/18/2024 at  9:57 AM    Drug Interactions  No Clinically Significant DDIs Were Identified at Present Time Upon Marking  Medications Reviewed    Recommended Medications Assessment  Aspirin: Currently Taking   Statin: Currently Taking   ACEi/ARB: Currently Taking     Adverse Drug Reactions  Medication tolerability: Tolerating with no to minimal ADRs  Medication plan: Continue therapy with normal follow-up  Plan for ADR Management: N/A at this time.  Pt reports they are tolerating therapy well.    Adherence, Self-Administration, and Current Therapy Problems  Adherence related to the patient's specialty therapy was discussed with the patient. The Adherence segment of this outreach has been reviewed and updated.     Adherence Questions  Linked Medication(s) Assessed: Dapagliflozin Prop-Metformin, Insulin Glargine, Tirzepatide  On average, how many doses/injections does the patient miss per month?: 0  What are the identified reasons for non-adherence or missed doses? : no problems identified  What is the estimated medication adherence level?: %  Based on the patient/caregiver response and refill history, does this patient require an MTP to track adherence improvements?: no    Additional Barriers to Patient Self-Administration: No known barriers at this time  Methods for Supporting Patient Self-Administration: Continued  enrollment    Open Medication Therapy Problems  No medication therapy recommendations to display    Goals of Therapy  Goals related to the patient's specialty therapy were discussed with the patient. The Patient Goals segment of this outreach has been reviewed and updated.   Goals Addressed Today        Specialty Pharmacy General Goal      Clinical Target: Maintain A1c 6-7%      Lab Results    Component Value Date     HGBA1C 5.5 04/17/2024 A1c increased to 5.5% from 4.9% during previous visit.  Still on track with A1c goal of <7%.  Will continue to follow and monitor.  CR    HGBA1C 4.9 11/14/2023 A1c 4.9% last month.  On track.  CR    HGBA1C 5.9 06/21/2023 5.9% - Unchanged from previous 2 visits.  Increasing  Mounjaro to 10mg weekly.  On track.  CR    HGBA1C 5.9 01/04/2023     HGBA1C 5.9 07/19/2022                    Quality of Life Assessment   Quality of Life related to the patient's enrollment in the patient management program and services provided was discussed with the patient. The QOL segment of this outreach has been reviewed and updated.  Quality of Life Improvement Scale: 9-A good deal better    Reassessment Plan & Follow-Up  1. Medication Therapy Changes: A1c increased to 5.5% from 4.9% during previous visit.  Still on track with A1c goal of <7%.  Will continue to follow and monitor.    2. Related Plans, Therapy Recommendations, or Issues to Be Addressed: None  3. Pharmacist to perform regular assessments no more than (6) months from the previous assessment.  4. Care Coordinator to set up future refill outreaches, coordinate prescription delivery, and escalate clinical questions to pharmacist.    Attestation  Therapeutic appropriateness: Appropriate   I attest the patient was actively involved in and has agreed to the above plan of care.  If the prescribed therapy is at any point deemed not appropriate based on the current or future assessments, a consultation will be initiated with the patient's specialty care provider to determine the best course of action. The revised plan of therapy will be documented along with any required assessments and/or additional patient education provided.       Geovanni Helms, PharmD, MPH  Clinical Specialty Pharmacist, Endocrinology  4/18/2024  10:05 EDT

## 2024-05-03 ENCOUNTER — SPECIALTY PHARMACY (OUTPATIENT)
Dept: ENDOCRINOLOGY | Facility: CLINIC | Age: 44
End: 2024-05-03
Payer: COMMERCIAL

## 2024-05-03 NOTE — PROGRESS NOTES
Specialty Pharmacy Patient Management Program  Endocrinology Refill Outreach      Inocencio is a 44 y.o. male contacted today regarding refills of his medication(s).    Specialty medication(s) and dose(s) confirmed: xigduo and Trulicity    Refill Questions      Flowsheet Row Most Recent Value   Changes to allergies? No   Changes to medications? No   New conditions or infections since last clinic visit No   Unplanned office visit, urgent care, ED, or hospital admission in the last 4 weeks  No   How does patient/caregiver feel medication is working? Very good   Financial problems or insurance changes  No   Since the previous refill, were any specialty medication doses or scheduled injections missed or delayed?  No   Does this patient require a clinical escalation to a pharmacist? No          Delivery Questions      Flowsheet Row Most Recent Value   Delivery method [employer]Ex   Delivery address verified with patient/caregiver? Yes   Delivery address Home   Number of medications in delivery 3   Medication(s) being filled and delivered Dapagliflozin Prop-Metformin, Dulaglutide, Insulin Pen Needle   Doses left of specialty medications 3 days   Copay verified? Yes   Copay amount $25   Copay form of payment Credit/debit on file            Follow-Up: 28-30d    Kya Tan CPhT  Pharmacy Care Coordinator, Endocrinology  5/3/2024  14:02 EDT

## 2024-05-30 ENCOUNTER — SPECIALTY PHARMACY (OUTPATIENT)
Dept: ENDOCRINOLOGY | Facility: CLINIC | Age: 44
End: 2024-05-30
Payer: COMMERCIAL

## 2024-05-30 NOTE — PROGRESS NOTES
Specialty Pharmacy Patient Management Program  Endocrinology Refill Outreach      Inocencio is a 44 y.o. male contacted today regarding refills of his medication(s).    Specialty medication(s) and dose(s) confirmed: Trulicity and Xigduo    Refill Questions      Flowsheet Row Most Recent Value   Changes to allergies? No   Changes to medications? No   New conditions or infections since last clinic visit No   Unplanned office visit, urgent care, ED, or hospital admission in the last 4 weeks  No   How does patient/caregiver feel medication is working? Very good   Financial problems or insurance changes  No   Since the previous refill, were any specialty medication doses or scheduled injections missed or delayed?  No   Does this patient require a clinical escalation to a pharmacist? No          Delivery Questions      Flowsheet Row Most Recent Value   Delivery method [employer]Ex   Delivery address verified with patient/caregiver? Yes   Delivery address Home   Number of medications in delivery 3   Medication(s) being filled and delivered Dulaglutide, Dapagliflozin Prop-Metformin, Continuous Glucose Sensor   Copay verified? Yes   Copay amount $25   Copay form of payment HSA/FSA on file            Follow-Up: 28-30 days    Kya Tan CPhT  Pharmacy Care Coordinator, Endocrinology  5/30/2024  09:45 EDT

## 2024-06-10 ENCOUNTER — SPECIALTY PHARMACY (OUTPATIENT)
Dept: ENDOCRINOLOGY | Facility: CLINIC | Age: 44
End: 2024-06-10
Payer: COMMERCIAL

## 2024-06-10 NOTE — PROGRESS NOTES
Specialty Pharmacy Patient Management Program  Endocrinology Refill Outreach      Inocencio is a 44 y.o. male contacted today regarding refills of his medication(s).    Specialty medication(s) and dose(s) confirmed: Toujeo    Refill Questions      Flowsheet Row Most Recent Value   Changes to allergies? No   Changes to medications? No   New conditions or infections since last clinic visit No   Unplanned office visit, urgent care, ED, or hospital admission in the last 4 weeks  No   How does patient/caregiver feel medication is working? Very good   Financial problems or insurance changes  No   Since the previous refill, were any specialty medication doses or scheduled injections missed or delayed?  No   Does this patient require a clinical escalation to a pharmacist? No          Delivery Questions      Flowsheet Row Most Recent Value   Delivery method FedEx   Delivery address verified with patient/caregiver? Yes   Delivery address Home   Number of medications in delivery 1   Medication(s) being filled and delivered Insulin Glargine   Doses left of specialty medications 1 week Toujeo   Copay verified? Yes   Copay amount $10   Copay form of payment HSA/FSA on file            Follow-Up: 72d    Xu Ellis CPhT  Pharmacy Care Coordinator, Endocrinology  6/10/2024  15:05 EDT

## 2024-07-01 ENCOUNTER — SPECIALTY PHARMACY (OUTPATIENT)
Dept: ENDOCRINOLOGY | Facility: CLINIC | Age: 44
End: 2024-07-01
Payer: COMMERCIAL

## 2024-07-01 NOTE — PROGRESS NOTES
Specialty Pharmacy Patient Management Program  Endocrinology Refill Outreach      Inocencio is a 44 y.o. male contacted today regarding refills of his medication(s).    Specialty medication(s) and dose(s) confirmed: xigduo and Trulicity    Refill Questions      Flowsheet Row Most Recent Value   Changes to allergies? No   Changes to medications? No   New conditions or infections since last clinic visit No   Unplanned office visit, urgent care, ED, or hospital admission in the last 4 weeks  No   How does patient/caregiver feel medication is working? Very good   Financial problems or insurance changes  No   Since the previous refill, were any specialty medication doses or scheduled injections missed or delayed?  No   Does this patient require a clinical escalation to a pharmacist? No          Delivery Questions      Flowsheet Row Most Recent Value   Delivery method FedEx   Delivery address verified with patient/caregiver? Yes   Delivery address Home   Number of medications in delivery 2   Medication(s) being filled and delivered Dapagliflozin Prop-Metformin, Dulaglutide   Doses left of specialty medications 50d of Glargine,  4 days of Xigduo,  last dose of trulicity taken for the week   Copay verified? Yes   Copay amount $25   Copay form of payment Credit/debit on file            Follow-Up: 28d    Kya Tan CPhT  Pharmacy Care Coordinator, Endocrinology  7/1/2024  09:51 EDT

## 2024-07-19 ENCOUNTER — OFFICE VISIT (OUTPATIENT)
Dept: ENDOCRINOLOGY | Facility: CLINIC | Age: 44
End: 2024-07-19
Payer: COMMERCIAL

## 2024-07-19 ENCOUNTER — SPECIALTY PHARMACY (OUTPATIENT)
Dept: ENDOCRINOLOGY | Facility: CLINIC | Age: 44
End: 2024-07-19
Payer: COMMERCIAL

## 2024-07-19 VITALS
SYSTOLIC BLOOD PRESSURE: 124 MMHG | HEIGHT: 72 IN | OXYGEN SATURATION: 98 % | WEIGHT: 267 LBS | BODY MASS INDEX: 36.16 KG/M2 | HEART RATE: 70 BPM | DIASTOLIC BLOOD PRESSURE: 72 MMHG

## 2024-07-19 DIAGNOSIS — I10 BENIGN HYPERTENSION: ICD-10-CM

## 2024-07-19 DIAGNOSIS — E11.65 TYPE 2 DIABETES MELLITUS WITH HYPERGLYCEMIA, WITH LONG-TERM CURRENT USE OF INSULIN: Primary | ICD-10-CM

## 2024-07-19 DIAGNOSIS — Z79.4 TYPE 2 DIABETES MELLITUS WITH HYPERGLYCEMIA, WITH LONG-TERM CURRENT USE OF INSULIN: Primary | ICD-10-CM

## 2024-07-19 DIAGNOSIS — E78.2 MIXED HYPERLIPIDEMIA: ICD-10-CM

## 2024-07-19 DIAGNOSIS — E11.42 TYPE 2 DIABETES MELLITUS WITH DIABETIC POLYNEUROPATHY, WITH LONG-TERM CURRENT USE OF INSULIN: ICD-10-CM

## 2024-07-19 DIAGNOSIS — Z79.4 TYPE 2 DIABETES MELLITUS WITH DIABETIC POLYNEUROPATHY, WITH LONG-TERM CURRENT USE OF INSULIN: ICD-10-CM

## 2024-07-19 PROCEDURE — 99214 OFFICE O/P EST MOD 30 MIN: CPT | Performed by: INTERNAL MEDICINE

## 2024-07-19 PROCEDURE — 83036 HEMOGLOBIN GLYCOSYLATED A1C: CPT | Performed by: INTERNAL MEDICINE

## 2024-07-19 PROCEDURE — 82947 ASSAY GLUCOSE BLOOD QUANT: CPT | Performed by: INTERNAL MEDICINE

## 2024-07-19 NOTE — PROGRESS NOTES
"   Specialty Pharmacy Patient Management Program  Endocrinology Reassessment     Inocencio Hicks was referred by an Endocrinology provider to the Endocrinology Patient Management program offered by [facility] for Type 2 Diabetes. A follow-up outreach was conducted, including assessment of continued therapy appropriateness, medication adherence, and side effect incidence and management for Toujeo, Trulicity, and Xigduo.    Changes to Insurance Coverage or Financial Support  None    Relevant Past Medical History and Comorbidities  Relevant medical history and concomitant health conditions were discussed with the patient. The patient's chart has been reviewed for relevant past medical history and comorbid health conditions and updated as necessary.   Past Medical History:   Diagnosis Date    Diabetes mellitus     Hyperlipidemia     Hypertension     Type 2 diabetes mellitus      Social History     Socioeconomic History    Marital status:    Tobacco Use    Smoking status: Former     Current packs/day: 0.00     Types: Cigarettes     Quit date: 2010     Years since quittin.6    Smokeless tobacco: Never   Vaping Use    Vaping status: Never Used   Substance and Sexual Activity    Alcohol use: Yes     Comment: rare    Drug use: Not Currently    Sexual activity: Defer     Problem list reviewed by Nanda Dixon PharmD on 2024 at  3:58 PM    Hospitalizations and Urgent Care Since Last Assessment  ED Visits, Admissions, or Hospitalizations: None  Urgent Office Visits: None    Allergies  Known allergies and reactions were discussed with the patient. The patient's chart has been reviewed for allergy information and updated as necessary.   Allergies   Allergen Reactions    Byetta 10 Mcg Pen [Exenatide] Nausea Only     Reports with Byetta and Pratibha     Victoza [Liraglutide] Nausea And Vomiting     \"sour stomach\"     Allergies reviewed by Nanda Dixon PharmD on 2024 at  3:58 " PM    Relevant Laboratory Values  Relevant laboratory values were discussed with the patient. The following specialty medication dose adjustment(s) are recommended: None  A1C Last 3 Results          11/14/2023    00:00 4/17/2024    15:29   HGBA1C Last 3 Results   Hemoglobin A1C 4.9     5.5       Details          This result is from an external source.             Lab Results   Component Value Date    HGBA1C 5.5 04/17/2024     Lab Results   Component Value Date    GLUCOSE 80 06/21/2023    CALCIUM 9.0 06/21/2023     06/21/2023    K 4.1 06/21/2023    CO2 26.0 06/21/2023     06/21/2023    BUN 13 06/21/2023    CREATININE 0.79 06/21/2023    BCR 16.5 06/21/2023    ANIONGAP 8.0 06/21/2023     Lab Results   Component Value Date    CHOL 133 06/21/2023    TRIG 54 06/21/2023    HDL 34 (L) 06/21/2023    LDL 87 06/21/2023       Current Medication List  This medication list has been reviewed with the patient and evaluated for any interactions or necessary modifications/recommendations, and updated to include all prescription medications, OTC medications, and supplements the patient is currently taking.  This list reflects what is contained in the patient's profile, which has also been marked as reviewed to communicate to other providers it is the most up to date version of the patient's current medication therapy.     Current Outpatient Medications:     albuterol sulfate  (90 Base) MCG/ACT inhaler, Inhale 2 puffs As Needed., Disp: , Rfl:     Aspirin 81 MG capsule, Take  by mouth., Disp: , Rfl:     celecoxib (CeleBREX) 200 MG capsule, Take 1 capsule by mouth Daily., Disp: , Rfl:     Continuous Glucose Sensor (FreeStyle Gloria 3 Sensor) [facility], Change sensor Every 14 (Fourteen) Days, as directed., Disp: 6 each, Rfl: 4    dapagliflozin-metformin HCl ER (Xigduo XR) 5-1000 MG tablet, Take 2 tablets by mouth Daily., Disp: 180 tablet, Rfl: 3    Dulaglutide (Trulicity) 3 MG/0.5ML solution pen-injector, Inject 0.5 mL under  the skin into the appropriate area as directed 1 (One) Time Per Week., Disp: 6 mL, Rfl: 3    DULoxetine (CYMBALTA) 60 MG capsule, Take 1 capsule by mouth Daily., Disp: , Rfl:     fexofenadine (ALLEGRA) 180 MG tablet, Take 1 tablet by mouth Daily. PRN ONLY, Disp: , Rfl:     glucose blood (FreeStyle Precision Derrek Test) test strip, Use with Gloria 2 [place] to Test Glucose When Needed (if Sensor is Not Working), Disp: 100 each, Rfl: 3    Insulin Glargine, 2 Unit Dial, (Toujeo Max SoloStar) 300 UNIT/ML solution pen-injector injection, Inject 50 Units under the skin into the appropriate area as directed Daily., Disp: 18 mL, Rfl: 3    Insulin Pen Needle (B-D UF III MINI PEN NEEDLES) 31G X 5 MM misc, Use as directed for insulin admininstration, Disp: 100 each, Rfl: 3    lisinopril (PRINIVIL,ZESTRIL) 20 MG tablet, Take 2 tablets by mouth Daily. Pt taking #2 20mg tablets once daily for 40mg daily dose, Disp: , Rfl:     omeprazole (priLOSEC) 20 MG capsule, Take 2 capsules by mouth Daily., Disp: , Rfl:     simvastatin (ZOCOR) 20 MG tablet, Take 20 mg by mouth., Disp: , Rfl:     SUMAtriptan (IMITREX) 100 MG tablet, Take 1 tablet by mouth As Needed., Disp: , Rfl:     Tirzepatide (Mounjaro) 10 MG/0.5ML solution pen-injector pen, Inject 0.5 mL under the skin into the appropriate area as directed Every 7 (Seven) Days., Disp: 6 mL, Rfl: 3    vitamin D (ERGOCALCIFEROL) 1.25 MG (30566 UT) capsule capsule, Take 1 capsule by mouth 1 (One) Time Per Week., Disp: , Rfl:     Medicines reviewed by Nanda Dixon, PharmD on 7/19/2024 at  3:58 PM    Drug Interactions  No Clinically Significant DDIs Were Identified at Present Time Upon Marking Medications Reviewed      Recommended Medications Assessment  Aspirin: Currently Taking   Statin: Currently Taking   ACEi/ARB: Currently Taking     Adverse Drug Reactions  Medication tolerability: Patient reported common adverse drug reaction  Medication plan: Continue therapy with normal follow-up  Plan  for ADR Management: None    Adherence, Self-Administration, and Current Therapy Problems  Adherence related to the patient's specialty therapy was discussed with the patient. The Adherence segment of this outreach has been reviewed and updated.     Adherence Questions  Linked Medication(s) Assessed: Dapagliflozin Prop-Metformin, Insulin Glargine  On average, how many doses/injections does the patient miss per month?: 0  What are the identified reasons for non-adherence or missed doses? : no problems identified  What is the estimated medication adherence level?: %  Based on the patient/caregiver response and refill history, does this patient require an MTP to track adherence improvements?: no    Additional Barriers to Patient Self-Administration: None  Methods for Supporting Patient Self-Administration: None    Open Medication Therapy Problems  No medication therapy recommendations to display    Goals of Therapy  Goals related to the patient's specialty therapy were discussed with the patient. The Patient Goals segment of this outreach has been reviewed and updated.   Goals Addressed Today        Specialty Pharmacy General Goal      Clinical Target: Maintain A1c 6-7%      Lab Results    Component Value Date     HGBA1c  5.9% 7/19/2024 A1c 5.9%. Decrease Toujeo by 4 units at bedtime to prevent hypoglycemia.     HGBA1C 5.5 04/17/2024 A1c increased to 5.5% from 4.9% during previous visit.  Still on track with A1c goal of <7%.  Will continue to follow and monitor.  CR    HGBA1C 4.9 11/14/2023 A1c 4.9% last month.  On track.  CR    HGBA1C 5.9 06/21/2023 5.9% - Unchanged from previous 2 visits.  Increasing Mounjaro to 10mg weekly.  On track.  CR    HGBA1C 5.9 01/04/2023     HGBA1C 5.9 07/19/2022                    Quality of Life Assessment   Quality of Life related to the patient's enrollment in the patient management program and services provided was discussed with the patient. The QOL segment of this outreach has  been reviewed and updated.  Quality of Life Improvement Scale: 8-Moderately better    Reassessment Plan & Follow-Up  1. Medication Therapy Changes: Toujeo decreased by 4 units at bedtime to prevent hypoglycemia. No changes to Trulicity, plan to switch back to Mounjaro once supply is off backorder.   2. Related Plans, Therapy Recommendations, or Issues to Be Addressed: None  3. Pharmacist to perform regular assessments no more than (6) months from the previous assessment.  4. Care Coordinator to set up future refill outreaches, coordinate prescription delivery, and escalate clinical questions to pharmacist.    Attestation  Therapeutic appropriateness: Appropriate   I attest the patient was actively involved in and has agreed to the above plan of care.  If the prescribed therapy is at any point deemed not appropriate based on the current or future assessments, a consultation will be initiated with the patient's specialty care provider to determine the best course of action. The revised plan of therapy will be documented along with any required assessments and/or additional patient education provided.     Nanda Dixon, PharmD, BCCCP, BCPS  Clinical Specialty Pharmacist, Endocrinology  7/19/2024  16:02 EDT

## 2024-07-22 LAB
EXPIRATION DATE: ABNORMAL
EXPIRATION DATE: ABNORMAL
GLUCOSE BLDC GLUCOMTR-MCNC: 87 MG/DL (ref 70–130)
HBA1C MFR BLD: 5.9 % (ref 4.5–5.7)
Lab: ABNORMAL
Lab: ABNORMAL

## 2024-07-22 NOTE — ASSESSMENT & PLAN NOTE
Diabetes is stable.  A1c looks good but having some nocturnal hypoglycemia.  Decrease basal insulin by 4 units.  Diabetes will be reassessed in 6 months.

## 2024-07-22 NOTE — PROGRESS NOTES
"     Office Note      Date: 2024  Patient Name: Inocencio Hicks  MRN: [id number]  : 1980    Chief Complaint   Patient presents with    Diabetes     Type II       History of Present Illness:   Inocencio Hicks is a 44 y.o. male who presents for Diabetes type 2. Diagnosed in: . Treated in past with oral agents. Current treatments: farxiga, metformin, trulicity and basal insulin. Number of insulin shots per day: 1. Checks blood sugar 288x per day - on FreeStyle Gloria 3. Has low blood sugar: occasional. Aspirin use: Yes. Statin use: Yes. ACE-I/ARB use: Yes. Changes in health since last visit: none. Last eye exam 10/2023     Subjective      Diabetic Complications:  Eyes: No  Kidneys: No  Feet: Yes - on cymbalta  Heart: No    Diet and Exercise:  Meals per day: 3  Minutes of exercise per week: 0 mins.    Review of Systems:   Review of Systems   Constitutional: Negative.    Cardiovascular: Negative.    Gastrointestinal: Negative.    Endocrine: Negative.        The following portions of the patient's history were reviewed and updated as appropriate: allergies, current medications, past family history, past medical history, past social history, past surgical history, and problem list.    Objective     Visit Vitals  /72 (BP Location: Left arm, Patient Position: Sitting, Cuff Size: Adult)   Pulse 70   Ht 182.9 cm (72\")   Wt 121 kg (267 lb)   SpO2 98%   BMI 36.21 kg/m²       Physical Exam:  Physical Exam  Constitutional:       Appearance: Normal appearance.   Cardiovascular:      Pulses:           Dorsalis pedis pulses are 2+ on the right side and 2+ on the left side.        Posterior tibial pulses are 2+ on the right side and 2+ on the left side.   Feet:      Right foot:      Protective Sensation: 5 sites tested.  5 sites sensed.      Skin integrity: Skin integrity normal.      Toenail Condition: Right toenails are normal.      Left foot:      Protective Sensation: 5 sites tested.  5 sites sensed.      " Skin integrity: Skin integrity normal.      Toenail Condition: Left toenails are normal.   Neurological:      Mental Status: He is alert.         Labs:    HbA1c  Lab Results   Component Value Date    HGBA1C 5.9 (A) 07/19/2024       CMP  Lab Results   Component Value Date    GLUCOSE 80 06/21/2023    BUN 13 06/21/2023    CREATININE 0.79 06/21/2023    BCR 16.5 06/21/2023    K 4.1 06/21/2023    CO2 26.0 06/21/2023    CALCIUM 9.0 06/21/2023    AST 17 06/21/2023    ALT 21 06/21/2023        Lipid Panel  Lab Results   Component Value Date    HDL 34 (L) 06/21/2023    LDL 87 06/21/2023    TRIG 54 06/21/2023        TSH  Lab Results   Component Value Date    TSH 0.631 06/21/2023        Hemoglobin A1C  Lab Results   Component Value Date    HGBA1C 5.9 (A) 07/19/2024        Microalbumin/Creatinine  Lab Results   Component Value Date    MALBCRERATIO  06/21/2023      Comment:      Unable to calculate    MICROALBUR <1.2 06/21/2023           Assessment / Plan      Assessment & Plan:  Diagnoses and all orders for this visit:    1. Type 2 diabetes mellitus with hyperglycemia, with long-term current use of insulin (Primary)  Assessment & Plan:  Diabetes is stable.  A1c looks good but having some nocturnal hypoglycemia.  Decrease basal insulin by 4 units.  Diabetes will be reassessed in 6 months.    Orders:  -     POC Glycosylated Hemoglobin (Hb A1C)  -     POC Glucose, Blood    2. Benign hypertension  Assessment & Plan:  Hypertension is stable and controlled  Continue current treatment regimen.  Blood pressure will be reassessed in 6 months.      3. Mixed hyperlipidemia  Assessment & Plan:  Continue statin.  Plan to check lipids next visit.      4. Type 2 diabetes mellitus with diabetic polyneuropathy, with long-term current use of insulin  Assessment & Plan:  Continue cymbalta.        Current Outpatient Medications   Medication Instructions    albuterol sulfate  (90 Base) MCG/ACT inhaler 2 puffs, Inhalation, As Needed    Aspirin  81 MG capsule Oral    celecoxib (CELEBREX) 200 mg, Oral, Daily    Continuous Glucose Sensor (FreeStyle Gloria 3 Sensor) misc Change sensor Every 14 (Fourteen) Days, as directed.    dapagliflozin-metformin HCl ER (Xigduo XR) 5-1000 MG tablet 2 tablets, Oral, Daily    DULoxetine (CYMBALTA) 60 mg, Oral, Daily    fexofenadine (ALLEGRA) 180 mg, Oral, Daily, PRN ONLY    glucose blood (FreeStyle Precision Derrek Test) test strip Use with Gloria 2 [place] to Test Glucose When Needed (if Sensor is Not Working)    Insulin Pen Needle (B-D UF III MINI PEN NEEDLES) 31G X 5 MM misc Use as directed for insulin admininstration    lisinopril (PRINIVIL,ZESTRIL) 40 mg, Oral, Daily, Pt taking #2 20mg tablets once daily for 40mg daily dose    Mounjaro 10 mg, Subcutaneous, Every 7 Days    omeprazole (PRILOSEC) 40 mg, Oral, Daily    simvastatin (ZOCOR) 20 mg, Oral    SUMAtriptan (IMITREX) 100 mg, Oral, As Needed    Toujeo Max SoloStar 50 Units, Subcutaneous, Daily    Trulicity 3 mg, Subcutaneous, Weekly    vitamin D (ERGOCALCIFEROL) 50,000 Units, Oral, Weekly      Return in about 6 months (around 1/19/2025) for Recheck with A1c, CMP, lipid, TSH, microalbumin, foot exam.    Electronically signed by: Brice Moura MD  07/19/2024

## 2024-07-29 ENCOUNTER — SPECIALTY PHARMACY (OUTPATIENT)
Dept: ENDOCRINOLOGY | Facility: CLINIC | Age: 44
End: 2024-07-29
Payer: COMMERCIAL

## 2024-09-03 ENCOUNTER — TELEPHONE (OUTPATIENT)
Dept: ENDOCRINOLOGY | Facility: CLINIC | Age: 44
End: 2024-09-03
Payer: COMMERCIAL

## 2024-09-03 ENCOUNTER — SPECIALTY PHARMACY (OUTPATIENT)
Dept: ENDOCRINOLOGY | Facility: CLINIC | Age: 44
End: 2024-09-03
Payer: COMMERCIAL

## 2024-09-03 NOTE — PROGRESS NOTES
Specialty Pharmacy Patient Management Program  Endocrinology Refill Outreach      Inocencio is a 44 y.o. male contacted today regarding refills of his medication(s).    Specialty medication(s) and dose(s) confirmed: Mounjaro and xigduo    Refill Questions      Flowsheet Row Most Recent Value   Changes to allergies? No   Changes to medications? No   New conditions or infections since last clinic visit No   Unplanned office visit, urgent care, ED, or hospital admission in the last 4 weeks  No   How does patient/caregiver feel medication is working? Very good   Financial problems or insurance changes  No   Since the previous refill, were any specialty medication doses or scheduled injections missed or delayed?  No   Does this patient require a clinical escalation to a pharmacist? No          Delivery Questions      Flowsheet Row Most Recent Value   Delivery method FedEx   Delivery address verified with patient/caregiver? Yes   Delivery address Home   Medication(s) being filled and delivered Tirzepatide, Dapagliflozin Prop-Metformin, Continuous Glucose Sensor   Doses left of specialty medications 1 week of trulicity before switching to Mounjaro   Copay verified? Yes   Copay amount $25   Copay form of payment Credit/debit on file   Ship Date 09/04   Delivery Date 09/05   Signature Required No            Follow-Up: 28d    Kya Tan CPhT  Pharmacy Care Coordinator, Endocrinology  9/3/2024  16:28 EDT

## 2024-09-03 NOTE — TELEPHONE ENCOUNTER
Specialty Pharmacy Patient Management Program  Per Protocol Prescription Order/Refill     Patient currently fills medications at [facility] and is enrolled in an Endocrinology Patient Management Program.     Requested Prescriptions     Pending Prescriptions Disp Refills    Tirzepatide (MOUNJARO) 10 MG/0.5ML solution pen-injector pen 6 mL 1     Sig: Inject 0.5 mL under the skin into the appropriate area as directed 1 (One) Time Per Week.     Prescription orders above were sent to the pharmacy per Collaborative Care Agreement Protocol.     Discussed with allie Lai to transition back to Mounjaro from [facility].    Last Office Visit: 7/19/24  Next Office Visit: 2/18/25    Nanda Dixon, Pharm.D. BCPS, BCCCP  Clinical Specialty Pharmacist, Endocrinology   13:33 EDT  09/03/24

## 2024-09-04 ENCOUNTER — TELEPHONE (OUTPATIENT)
Dept: ENDOCRINOLOGY | Facility: CLINIC | Age: 44
End: 2024-09-04
Payer: COMMERCIAL

## 2024-09-04 RX ORDER — BLOOD-GLUCOSE SENSOR
1 EACH MISCELLANEOUS TAKE AS DIRECTED
Qty: 6 EACH | Refills: 3 | Status: SHIPPED | OUTPATIENT
Start: 2024-09-04

## 2024-09-04 NOTE — TELEPHONE ENCOUNTER
Specialty Pharmacy Patient Management Program  Per Protocol Prescription Order/Refill     Patient currently fills medications at [facility] and is enrolled in an Endocrinology Patient Management Program.     Requested Prescriptions     Pending Prescriptions Disp Refills    Continuous Glucose Sensor (FreeStyle Gloria 3 Plus Sensor) misc 6 each 3     Sig: Use 1 each Take As Directed. Use 1 sensor every 15 days.     Prescription orders above were sent to the pharmacy per Collaborative Care Agreement Protocol.     Due to phase out of Gloria 3, Gloria 3 Plus sensors were ordered.     Last Office Visit: 7/19/24  Next Office Visit: 2/18/25    Nanda Dixon, Pharm.D. BCPS, BCCCP  Clinical Specialty Pharmacist, Endocrinology   09:44 EDT  09/04/24

## 2024-09-10 ENCOUNTER — TELEPHONE (OUTPATIENT)
Dept: ENDOCRINOLOGY | Facility: CLINIC | Age: 44
End: 2024-09-10
Payer: COMMERCIAL

## 2024-09-10 RX ORDER — INSULIN GLARGINE 300 U/ML
50 INJECTION, SOLUTION SUBCUTANEOUS DAILY
Qty: 18 ML | Refills: 3 | Status: SHIPPED | OUTPATIENT
Start: 2024-09-10

## 2024-09-10 NOTE — TELEPHONE ENCOUNTER
Specialty Pharmacy Patient Management Program  Per Protocol Prescription Order/Refill     Patient currently fills medications at [facility] and is enrolled in an Endocrinology Patient Management Program.     Requested Prescriptions     Pending Prescriptions Disp Refills    Insulin Glargine, 2 Unit Dial, (Toujeo Max SoloStar) 300 UNIT/ML solution pen-injector injection 18 mL 3     Sig: Inject 50 Units under the skin into the appropriate area as directed Daily.     Prescription orders above were sent to the pharmacy per Collaborative Care Agreement Protocol.     Last Office Visit: 7/19/24  Next Office Visit: 2/18/25    Nanda Dixon, Pharm.D. BCPS, BCCCP  Clinical Specialty Pharmacist, Endocrinology   13:04 EDT  09/10/24

## 2024-10-07 ENCOUNTER — SPECIALTY PHARMACY (OUTPATIENT)
Dept: ENDOCRINOLOGY | Facility: CLINIC | Age: 44
End: 2024-10-07
Payer: COMMERCIAL

## 2024-10-07 NOTE — PROGRESS NOTES
Specialty Pharmacy Patient Management Program  Endocrinology Refill Outreach      Inocencio is a 44 y.o. male contacted today regarding refills of his medication(s).    Specialty medication(s) and dose(s) confirmed: xigduo and mounjaro    Refill Questions      Flowsheet Row Most Recent Value   Changes to allergies? No   Changes to medications? No   New conditions or infections since last clinic visit No   Unplanned office visit, urgent care, ED, or hospital admission in the last 4 weeks  No   How does patient/caregiver feel medication is working? Very good   Financial problems or insurance changes  No   Since the previous refill, were any specialty medication doses or scheduled injections missed or delayed?  No   Does this patient require a clinical escalation to a pharmacist? No          Delivery Questions      Flowsheet Row Most Recent Value   Delivery method UPS   Delivery address verified with patient/caregiver? Yes   Delivery address Home   Number of medications in delivery 2   Medication(s) being filled and delivered Dapagliflozin Prop-Metformin, Tirzepatide   Doses left of specialty medications two weeks of mounjaro   Copay verified? Yes   Copay amount 25   Copay form of payment Credit/debit on file   Ship Date 10/08   Delivery Date 10/09   Signature Required No            Follow-Up: 28d    Kya Tan CPhT  Pharmacy Care Coordinator, Endocrinology  10/7/2024  14:26 EDT

## 2024-11-04 ENCOUNTER — SPECIALTY PHARMACY (OUTPATIENT)
Age: 44
End: 2024-11-04
Payer: COMMERCIAL

## 2024-11-04 NOTE — PROGRESS NOTES
Specialty Pharmacy Patient Management Program  Endocrinology Refill Outreach      Inocencio is a 44 y.o. male contacted today regarding refills of his medication(s).    Specialty medication(s) and dose(s) confirmed: Mounjaro, Xigduo.    Refill Questions      Flowsheet Row Most Recent Value   Changes to allergies? No   Changes to medications? No   New conditions or infections since last clinic visit No   Unplanned office visit, urgent care, ED, or hospital admission in the last 4 weeks  No   How does patient/caregiver feel medication is working? Very good   Financial problems or insurance changes  No   Since the previous refill, were any specialty medication doses or scheduled injections missed or delayed?  No   Does this patient require a clinical escalation to a pharmacist? No          Delivery Questions      Flowsheet Row Most Recent Value   Delivery method UPS   Delivery address verified with patient/caregiver? Yes   Delivery address Home   Number of medications in delivery 2   Medication(s) being filled and delivered Tirzepatide (MOUNJARO), Dapagliflozin Prop-metFORMIN (Xigduo XR)   Doses left of specialty medications 1   Copay verified? Yes   Copay amount 25   Copay form of payment Credit/debit on file   Ship Date 11/04   Delivery Date 11/05   Signature Required No            Follow-Up: 28d    Xu Ellis CPhT  Pharmacy Care Coordinator, Endocrinology  11/4/2024  08:13 EST

## 2024-12-05 ENCOUNTER — SPECIALTY PHARMACY (OUTPATIENT)
Dept: ENDOCRINOLOGY | Facility: CLINIC | Age: 44
End: 2024-12-05
Payer: COMMERCIAL

## 2024-12-05 NOTE — PROGRESS NOTES
Specialty Pharmacy Patient Management Program  Endocrinology Refill Outreach      Inocencio is a 44 y.o. male contacted today regarding refills of his medication(s).    Specialty medication(s) and dose(s) confirmed:     Refill Questions      Flowsheet Row Most Recent Value   Changes to allergies? No   Changes to medications? No   New conditions or infections since last clinic visit No   Unplanned office visit, urgent care, ED, or hospital admission in the last 4 weeks  No   How does patient/caregiver feel medication is working? Very good   Financial problems or insurance changes  No   Since the previous refill, were any specialty medication doses or scheduled injections missed or delayed?  No   Does this patient require a clinical escalation to a pharmacist? No          Delivery Questions      Flowsheet Row Most Recent Value   Delivery address verified with patient/caregiver? Yes   Delivery address Home   Number of medications in delivery 5   Medication(s) being filled and delivered Dapagliflozin Prop-metFORMIN (Xigduo XR), Continuous Glucose Sensor (FreeStyle Gloria 3 Plus Sensor), Insulin Pen Needle (B-D UF III MINI PEN NEEDLES), Tirzepatide (MOUNJARO), Insulin Glargine (Toujeo Max SoloStar)   Doses left of specialty medications 0   Copay verified? Yes   Copay amount 35.00   Copay form of payment Credit/debit on file   Ship Date 12/5   Delivery Date 12/6   Signature Required No            Follow-Up: 30d    Nanda Dixon, Pharm.D. BCPS, BCCCP  Clinical Specialty Pharmacist, Endocrinology   12:10 EST  12/05/24

## 2025-01-08 ENCOUNTER — SPECIALTY PHARMACY (OUTPATIENT)
Dept: GENERAL RADIOLOGY | Facility: HOSPITAL | Age: 45
End: 2025-01-08
Payer: COMMERCIAL

## 2025-01-08 ENCOUNTER — SPECIALTY PHARMACY (OUTPATIENT)
Age: 45
End: 2025-01-08
Payer: COMMERCIAL

## 2025-01-08 RX ORDER — DAPAGLIFLOZIN AND METFORMIN HYDROCHLORIDE 5; 1000 MG/1; MG/1
2 TABLET, FILM COATED, EXTENDED RELEASE ORAL DAILY
Qty: 180 TABLET | Refills: 3 | Status: SHIPPED | OUTPATIENT
Start: 2025-01-08

## 2025-01-08 NOTE — PROGRESS NOTES
Specialty Pharmacy Patient Management Program  Endocrinology Refill Outreach      Inocencio is a 44 y.o. male contacted today regarding refills of his medication(s).    Specialty medication(s) and dose(s) confirmed: Mounjaro.    Refill Questions      Flowsheet Row Most Recent Value   Changes to allergies? No   Changes to medications? No   New conditions or infections since last clinic visit No   Unplanned office visit, urgent care, ED, or hospital admission in the last 4 weeks  No   How does patient/caregiver feel medication is working? Very good   Financial problems or insurance changes  No   Since the previous refill, were any specialty medication doses or scheduled injections missed or delayed?  No   Does this patient require a clinical escalation to a pharmacist? No          Delivery Questions      Flowsheet Row Most Recent Value   Delivery method UPS   Delivery address verified with patient/caregiver? Yes   Delivery address Home   Number of medications in delivery 1   Medication(s) being filled and delivered Tirzepatide (MOUNJARO)   Doses left of specialty medications 1   Copay verified? Yes   Copay amount $25   Copay form of payment Credit/debit on file   Ship Date 12/31   Delivery Date 1/2   Signature Required No            Follow-Up: 28d    Xu Ellis CPhT  Pharmacy Care Coordinator, Endocrinology  1/8/2025  13:18 EST

## 2025-01-08 NOTE — PROGRESS NOTES
"   Specialty Pharmacy Patient Management Program  Endocrinology Reassessment     Inocencio Hicks was referred by an Endocrinology provider to the Endocrinology Patient Management program offered by [facility] for Type 2 Diabetes. A follow-up outreach was conducted, including assessment of continued therapy appropriateness, medication adherence, and side effect incidence and management for Mounjaro, Toujeo, and Xigduo.    Changes to Insurance Coverage or Financial Support  No changes to insurance    Relevant Past Medical History and Comorbidities  Relevant medical history and concomitant health conditions were discussed with the patient. The patient's chart has been reviewed for relevant past medical history and comorbid health conditions and updated as necessary.   Past Medical History:   Diagnosis Date    Diabetes mellitus     Hyperlipidemia     Hypertension     Type 2 diabetes mellitus      Social History     Socioeconomic History    Marital status:    Tobacco Use    Smoking status: Former     Current packs/day: 0.00     Types: Cigarettes     Quit date: 2010     Years since quittin.1    Smokeless tobacco: Never   Vaping Use    Vaping status: Never Used   Substance and Sexual Activity    Alcohol use: Yes     Comment: rare    Drug use: Not Currently    Sexual activity: Defer     Problem list reviewed by Leander Tony RPH on 2025 at  1:06 PM    Hospitalizations and Urgent Care Since Last Assessment  ED Visits, Admissions, or Hospitalizations: None  Urgent Office Visits: None    Allergies  Known allergies and reactions were discussed with the patient. The patient's chart has been reviewed for allergy information and updated as necessary.   Allergies   Allergen Reactions    Byetta 10 Mcg Pen [Exenatide] Nausea Only     Reports with Byetta and Bydureon     Victoza [Liraglutide] Nausea And Vomiting     \"sour stomach\"     Allergies reviewed by Leander Tony RPH on 2025 " at  1:06 PM    Relevant Laboratory Values  Relevant laboratory values were discussed with the patient. The following specialty medication dose adjustment(s) are recommended: None  A1C Last 3 Results          4/17/2024    15:29 7/19/2024    14:32   HGBA1C Last 3 Results   Hemoglobin A1C 5.5  5.9      Lab Results   Component Value Date    HGBA1C 5.9 (A) 07/19/2024     Lab Results   Component Value Date    GLUCOSE 80 06/21/2023    CALCIUM 9.0 06/21/2023     06/21/2023    K 4.1 06/21/2023    CO2 26.0 06/21/2023     06/21/2023    BUN 13 06/21/2023    CREATININE 0.79 06/21/2023    BCR 16.5 06/21/2023    ANIONGAP 8.0 06/21/2023     Lab Results   Component Value Date    CHOL 133 06/21/2023    TRIG 54 06/21/2023    HDL 34 (L) 06/21/2023    LDL 87 06/21/2023       Current Medication List  This medication list has been reviewed with the patient and evaluated for any interactions or necessary modifications/recommendations, and updated to include all prescription medications, OTC medications, and supplements the patient is currently taking.  This list reflects what is contained in the patient's profile, which has also been marked as reviewed to communicate to other providers it is the most up to date version of the patient's current medication therapy.     Current Outpatient Medications:     dapagliflozin-metformin HCl ER (Xigduo XR) 5-1000 MG tablet, Take 2 tablets by mouth Daily., Disp: 180 tablet, Rfl: 3    albuterol sulfate  (90 Base) MCG/ACT inhaler, Inhale 2 puffs As Needed., Disp: , Rfl:     Aspirin 81 MG capsule, Take  by mouth., Disp: , Rfl:     celecoxib (CeleBREX) 200 MG capsule, Take 1 capsule by mouth Daily., Disp: , Rfl:     Continuous Glucose Sensor (FreeStyle Gloria 3 Plus Sensor), Use 1 sensor every 15 days., Disp: 6 each, Rfl: 3    DULoxetine (CYMBALTA) 60 MG capsule, Take 1 capsule by mouth Daily., Disp: , Rfl:     fexofenadine (ALLEGRA) 180 MG tablet, Take 1 tablet by mouth Daily. PRN ONLY,  Disp: , Rfl:     glucose blood (FreeStyle Precision Derrek Test) test strip, Use with Gloria 2 [place] to Test Glucose When Needed (if Sensor is Not Working), Disp: 100 each, Rfl: 3    Insulin Glargine, 2 Unit Dial, (Toujeo Max SoloStar) 300 UNIT/ML solution pen-injector injection, Inject 50 Units under the skin into the appropriate area as directed Daily., Disp: 18 mL, Rfl: 3    Insulin Pen Needle (B-D UF III MINI PEN NEEDLES) 31G X 5 MM misc, Use as directed for insulin admininstration, Disp: 100 each, Rfl: 3    lisinopril (PRINIVIL,ZESTRIL) 20 MG tablet, Take 2 tablets by mouth Daily. Pt taking #2 20mg tablets once daily for 40mg daily dose, Disp: , Rfl:     omeprazole (priLOSEC) 20 MG capsule, Take 2 capsules by mouth Daily., Disp: , Rfl:     simvastatin (ZOCOR) 20 MG tablet, Take 20 mg by mouth., Disp: , Rfl:     SUMAtriptan (IMITREX) 100 MG tablet, Take 1 tablet by mouth As Needed., Disp: , Rfl:     Tirzepatide 10 MG/0.5ML solution auto-injector, Inject 0.5 mL under the skin into the appropriate area as directed 1 (One) Time Per Week., Disp: 6 mL, Rfl: 1    vitamin D (ERGOCALCIFEROL) 1.25 MG (12364 UT) capsule capsule, Take 1 capsule by mouth 1 (One) Time Per Week., Disp: , Rfl:     Medicines reviewed by Leander Tony RP on 1/8/2025 at  1:06 PM    Drug Interactions  No Clinically Significant DDIs Were Identified at Present Time Upon Marking Medications Reviewed      Recommended Medications Assessment  Aspirin: Currently Taking   Statin: Currently Taking   ACEi/ARB: Currently Taking     Adverse Drug Reactions  Medication tolerability: Tolerating with no to minimal ADRs  Medication plan: Continue therapy with normal follow-up  Plan for ADR Management: No adverse drug reactions reported.    Adherence, Self-Administration, and Current Therapy Problems  Adherence related to the patient's specialty therapy was discussed with the patient. The Adherence segment of this outreach has been reviewed and updated.      Adherence Questions  Linked Medication(s) Assessed: Dapagliflozin Prop-metFORMIN (XIGDUO XR), Tirzepatide (MOUNJARO), Insulin Glargine (Toujeo Max SoloStar)  On average, how many doses/injections does the patient miss per month?: 0  What are the identified reasons for non-adherence or missed doses? : no problems identified  What is the estimated medication adherence level?: (S) % (Mounjaro 54%, has been taking regularly since September)  Based on the patient/caregiver response and refill history, does this patient require an MTP to track adherence improvements?: no    Additional Barriers to Patient Self-Administration: None  Methods for Supporting Patient Self-Administration: n/a    Open Medication Therapy Problems  No medication therapy recommendations to display    Goals of Therapy  Goals related to the patient's specialty therapy were discussed with the patient. The Patient Goals segment of this outreach has been reviewed and updated.   Goals Addressed Today        Specialty Pharmacy General Goal      Clinical Target: Maintain A1c 6-7%      Lab Results    Component Value Date     Virtual  01/08/2025 Patient tolerating medications well.  No side effects or issues reported.  Will tyrese on track and follow up at next appointment. RS    HGBA1c  5.9% 7/19/2024 A1c 5.9%. Decrease Toujeo by 4 units at bedtime to prevent hypoglycemia.     HGBA1C 5.5 04/17/2024 A1c increased to 5.5% from 4.9% during previous visit.  Still on track with A1c goal of <7%.  Will continue to follow and monitor.  CR    HGBA1C 4.9 11/14/2023 A1c 4.9% last month.  On track.  CR    HGBA1C 5.9 06/21/2023 5.9% - Unchanged from previous 2 visits.  Increasing Mounjaro to 10mg weekly.  On track.  CR    HGBA1C 5.9 01/04/2023     HGBA1C 5.9 07/19/2022                    Quality of Life Assessment   Quality of Life related to the patient's enrollment in the patient management program and services provided was discussed with the patient. The QOL  segment of this outreach has been reviewed and updated.  Quality of Life Improvement Scale: 8-Moderately better    Reassessment Plan & Follow-Up  1. Medication Therapy Changes: No medication changes  2. Related Plans, Therapy Recommendations, or Issues to Be Addressed: Will follow up at next appointment.  3. Pharmacist to perform regular assessments no more than (6) months from the previous assessment.  4. Care Coordinator to set up future refill outreaches, coordinate prescription delivery, and escalate clinical questions to pharmacist.    Attestation  Therapeutic appropriateness: Appropriate   I attest the patient was actively involved in and has agreed to the above plan of care.  If the prescribed therapy is at any point deemed not appropriate based on the current or future assessments, a consultation will be initiated with the patient's specialty care provider to determine the best course of action. The revised plan of therapy will be documented along with any required assessments and/or additional patient education provided.     Mesfin Tony, PharmD  Clinical Specialty Pharmacist, Endocrinology  1/8/2025  13:10 EST

## 2025-01-08 NOTE — PROGRESS NOTES
Specialty Pharmacy Patient Management Program  Endocrinology Refill Outreach      Inocencio is a 44 y.o. male contacted today regarding refills of his medication(s).    Specialty medication(s) and dose(s) confirmed: Xigduo    Refill Questions      Flowsheet Row Most Recent Value   Changes to allergies? No   Changes to medications? No   New conditions or infections since last clinic visit No   Unplanned office visit, urgent care, ED, or hospital admission in the last 4 weeks  No   How does patient/caregiver feel medication is working? Very good   Financial problems or insurance changes  No   Since the previous refill, were any specialty medication doses or scheduled injections missed or delayed?  No   Does this patient require a clinical escalation to a pharmacist? No          Delivery Questions      Flowsheet Row Most Recent Value   Delivery method UPS   Delivery address verified with patient/caregiver? Yes   Delivery address Home   Number of medications in delivery 1   Medication(s) being filled and delivered Dapagliflozin Prop-metFORMIN (Xigduo XR)   Doses left of specialty medications Xigduo- 3 days   Copay verified? Yes   Copay amount $0   Copay form of payment No copayment ($0)   Ship Date 01/09/2025   Delivery Date 01/10/2025   Signature Required No            Follow-Up: 30 days    Leander Tony Pharm.D.  Clinical Specialty Pharmacist, Endocrinology  13:10 EST 01/08/25

## 2025-01-08 NOTE — PROGRESS NOTES
Specialty Pharmacy Patient Management Program  Per Protocol Prescription Order/Refill     Patient currently fills medications at [facility] and is enrolled in an Endocrinology Patient Management Program.     Requested Prescriptions     Pending Prescriptions Disp Refills    dapagliflozin-metformin HCl ER (Xigduo XR) 5-1000 MG tablet 180 tablet 3     Sig: Take 2 tablets by mouth Daily.     Prescription orders above were sent to the pharmacy per Collaborative Care Agreement Protocol.     Patient out of refills.  Sent via [facility]      Leander [email or web address].  Clinical Specialty Pharmacist, Endocrinology  12:41 EST 01/08/25

## 2025-01-28 ENCOUNTER — SPECIALTY PHARMACY (OUTPATIENT)
Dept: ENDOCRINOLOGY | Facility: CLINIC | Age: 45
End: 2025-01-28
Payer: COMMERCIAL

## 2025-01-28 NOTE — PROGRESS NOTES
Specialty Pharmacy Patient Management Program  Endocrinology Refill Outreach      Inocencio is a 44 y.o. male contacted today regarding refills of his medication(s).    Specialty medication(s) and dose(s) confirmed: mounjaro    Refill Questions      Flowsheet Row Most Recent Value   Changes to allergies? No   Changes to medications? No   New conditions or infections since last clinic visit No   Unplanned office visit, urgent care, ED, or hospital admission in the last 4 weeks  No   How does patient/caregiver feel medication is working? Very good   Financial problems or insurance changes  No   Since the previous refill, were any specialty medication doses or scheduled injections missed or delayed?  No   Does this patient require a clinical escalation to a pharmacist? No          Delivery Questions      Flowsheet Row Most Recent Value   Delivery method UPS   Delivery address verified with patient/caregiver? Yes   Delivery address Home   Number of medications in delivery 1   Medication(s) being filled and delivered Tirzepatide (MOUNJARO)   Doses left of specialty medications due this week   Copay verified? Yes   Copay amount 25   Copay form of payment HSA/FSA on file   Ship Date 01/28   Delivery Date Selection 01/29/25   Signature Required No            Follow-Up: 28d    Kya Tan CPhT  Pharmacy Care Coordinator, Endocrinology  1/28/2025  16:13 EST

## 2025-01-31 ENCOUNTER — SPECIALTY PHARMACY (OUTPATIENT)
Dept: ENDOCRINOLOGY | Facility: CLINIC | Age: 45
End: 2025-01-31
Payer: COMMERCIAL

## 2025-01-31 NOTE — PROGRESS NOTES
Specialty Pharmacy Patient Management Program  Endocrinology Refill Outreach      Inocencio is a 44 y.o. male contacted today regarding refills of his medication(s).    Specialty medication(s) and dose(s) confirmed: xigduo    Refill Questions      Flowsheet Row Most Recent Value   Changes to allergies? No   Changes to medications? No   New conditions or infections since last clinic visit No   Unplanned office visit, urgent care, ED, or hospital admission in the last 4 weeks  No   How does patient/caregiver feel medication is working? Very good   Financial problems or insurance changes  No   Since the previous refill, were any specialty medication doses or scheduled injections missed or delayed?  No   Does this patient require a clinical escalation to a pharmacist? No          Delivery Questions      Flowsheet Row Most Recent Value   Delivery method UPS   Delivery address verified with patient/caregiver? Yes   Delivery address Home   Number of medications in delivery 1   Medication(s) being filled and delivered Dapagliflozin Prop-metFORMIN (XIGDUO XR)   Doses left of specialty medications 3 days left   Copay verified? Yes   Copay amount 0   Copay form of payment No copayment ($0)   Ship Date 01/29   Delivery Date Selection 01/30/25   Signature Required No            Follow-Up: 30d    Kya Tan CPhT  Pharmacy Care Coordinator, Endocrinology  1/31/2025  14:27 EST

## 2025-02-10 ENCOUNTER — TELEPHONE (OUTPATIENT)
Dept: GENERAL RADIOLOGY | Facility: HOSPITAL | Age: 45
End: 2025-02-10
Payer: COMMERCIAL

## 2025-02-10 NOTE — TELEPHONE ENCOUNTER
Specialty Pharmacy Patient Management Program  Per Protocol Prescription Order/Refill     Patient currently fills medications at [facility] and is enrolled in an Endocrinology Patient Management Program.     Requested Prescriptions     Pending Prescriptions Disp Refills    Tirzepatide 10 MG/0.5ML solution auto-injector 2 mL 3     Sig: Inject 0.5 mL under the skin into the appropriate area as directed 1 (One) Time Per Week.     Prescription orders above were sent to the pharmacy per Collaborative Care Agreement Protocol.     Patient needed refill.  Sent via [facility]    Leander Tony Pharm.D.  Clinical Specialty Pharmacist, Endocrinology  15:53 EST 02/10/25

## 2025-02-13 ENCOUNTER — SPECIALTY PHARMACY (OUTPATIENT)
Dept: ENDOCRINOLOGY | Facility: CLINIC | Age: 45
End: 2025-02-13
Payer: COMMERCIAL

## 2025-02-13 NOTE — PROGRESS NOTES
Specialty Pharmacy Patient Management Program  Endocrinology Refill Outreach      Inocencio is a 44 y.o. male contacted today regarding refills of his medication(s).    Specialty medication(s) and dose(s) confirmed: Toujeo    Refill Questions      Flowsheet Row Most Recent Value   Changes to allergies? No   Changes to medications? No   New conditions or infections since last clinic visit No   Unplanned office visit, urgent care, ED, or hospital admission in the last 4 weeks  No   How does patient/caregiver feel medication is working? Very good   Financial problems or insurance changes  No   Since the previous refill, were any specialty medication doses or scheduled injections missed or delayed?  No   Does this patient require a clinical escalation to a pharmacist? No          Delivery Questions      Flowsheet Row Most Recent Value   Delivery method UPS   Delivery address verified with patient/caregiver? Yes   Delivery address Home   Number of medications in delivery 2   Medication(s) being filled and delivered Insulin Glargine (Toujeo Max SoloStar), Continuous Glucose Sensor (FreeStyle Gloria 3 Plus Sensor)   Doses left of specialty medications 2 weeks of Mounjaro   Copay verified? Yes   Copay amount 10   Copay form of payment Credit/debit on file   Ship Date 02/13   Delivery Date Selection 02/14/25   Signature Required No            Follow-Up: 72d    Kya Tan CPhT  Pharmacy Care Coordinator, Endocrinology  2/13/2025  13:52 EST

## 2025-02-18 ENCOUNTER — SPECIALTY PHARMACY (OUTPATIENT)
Dept: ENDOCRINOLOGY | Facility: CLINIC | Age: 45
End: 2025-02-18
Payer: COMMERCIAL

## 2025-02-18 ENCOUNTER — OFFICE VISIT (OUTPATIENT)
Dept: ENDOCRINOLOGY | Facility: CLINIC | Age: 45
End: 2025-02-18
Payer: COMMERCIAL

## 2025-02-18 VITALS
SYSTOLIC BLOOD PRESSURE: 102 MMHG | DIASTOLIC BLOOD PRESSURE: 62 MMHG | HEIGHT: 72 IN | HEART RATE: 80 BPM | OXYGEN SATURATION: 99 % | BODY MASS INDEX: 36.03 KG/M2 | WEIGHT: 266 LBS

## 2025-02-18 DIAGNOSIS — Z79.4 TYPE 2 DIABETES MELLITUS WITH HYPERGLYCEMIA, WITH LONG-TERM CURRENT USE OF INSULIN: Primary | ICD-10-CM

## 2025-02-18 DIAGNOSIS — E11.42 TYPE 2 DIABETES MELLITUS WITH DIABETIC POLYNEUROPATHY, WITH LONG-TERM CURRENT USE OF INSULIN: ICD-10-CM

## 2025-02-18 DIAGNOSIS — I10 BENIGN HYPERTENSION: ICD-10-CM

## 2025-02-18 DIAGNOSIS — E11.65 TYPE 2 DIABETES MELLITUS WITH HYPERGLYCEMIA, WITH LONG-TERM CURRENT USE OF INSULIN: Primary | ICD-10-CM

## 2025-02-18 DIAGNOSIS — Z79.4 TYPE 2 DIABETES MELLITUS WITH DIABETIC POLYNEUROPATHY, WITH LONG-TERM CURRENT USE OF INSULIN: ICD-10-CM

## 2025-02-18 DIAGNOSIS — E78.2 MIXED HYPERLIPIDEMIA: ICD-10-CM

## 2025-02-18 LAB
ALBUMIN SERPL-MCNC: 4.4 G/DL (ref 3.5–5.2)
ALBUMIN/GLOB SERPL: 1.5 G/DL
ALP SERPL-CCNC: 80 U/L (ref 39–117)
ALT SERPL W P-5'-P-CCNC: 30 U/L (ref 1–41)
ANION GAP SERPL CALCULATED.3IONS-SCNC: 12 MMOL/L (ref 5–15)
AST SERPL-CCNC: 21 U/L (ref 1–40)
BILIRUB SERPL-MCNC: 0.4 MG/DL (ref 0–1.2)
BUN SERPL-MCNC: 16 MG/DL (ref 6–20)
BUN/CREAT SERPL: 18 (ref 7–25)
CALCIUM SPEC-SCNC: 10 MG/DL (ref 8.6–10.5)
CHLORIDE SERPL-SCNC: 103 MMOL/L (ref 98–107)
CHOLEST SERPL-MCNC: 137 MG/DL (ref 0–200)
CO2 SERPL-SCNC: 25 MMOL/L (ref 22–29)
CREAT SERPL-MCNC: 0.89 MG/DL (ref 0.76–1.27)
EGFRCR SERPLBLD CKD-EPI 2021: 107.7 ML/MIN/1.73
EXPIRATION DATE: ABNORMAL
EXPIRATION DATE: NORMAL
GLOBULIN UR ELPH-MCNC: 2.9 GM/DL
GLUCOSE BLDC GLUCOMTR-MCNC: 104 MG/DL (ref 70–130)
GLUCOSE SERPL-MCNC: 74 MG/DL (ref 65–99)
HBA1C MFR BLD: 6.1 % (ref 4.5–5.7)
HDLC SERPL-MCNC: 41 MG/DL (ref 40–60)
LDLC SERPL CALC-MCNC: 84 MG/DL (ref 0–100)
LDLC/HDLC SERPL: 2.08 {RATIO}
Lab: ABNORMAL
Lab: NORMAL
POTASSIUM SERPL-SCNC: 4.3 MMOL/L (ref 3.5–5.2)
PROT SERPL-MCNC: 7.3 G/DL (ref 6–8.5)
SODIUM SERPL-SCNC: 140 MMOL/L (ref 136–145)
TRIGL SERPL-MCNC: 53 MG/DL (ref 0–150)
TSH SERPL DL<=0.05 MIU/L-ACNC: 1.19 UIU/ML (ref 0.27–4.2)
VLDLC SERPL-MCNC: 12 MG/DL (ref 5–40)

## 2025-02-18 PROCEDURE — 95251 CONT GLUC MNTR ANALYSIS I&R: CPT | Performed by: INTERNAL MEDICINE

## 2025-02-18 PROCEDURE — 80061 LIPID PANEL: CPT | Performed by: INTERNAL MEDICINE

## 2025-02-18 PROCEDURE — 82570 ASSAY OF URINE CREATININE: CPT | Performed by: INTERNAL MEDICINE

## 2025-02-18 PROCEDURE — 83036 HEMOGLOBIN GLYCOSYLATED A1C: CPT | Performed by: INTERNAL MEDICINE

## 2025-02-18 PROCEDURE — 82043 UR ALBUMIN QUANTITATIVE: CPT | Performed by: INTERNAL MEDICINE

## 2025-02-18 PROCEDURE — 80053 COMPREHEN METABOLIC PANEL: CPT | Performed by: INTERNAL MEDICINE

## 2025-02-18 PROCEDURE — 99214 OFFICE O/P EST MOD 30 MIN: CPT | Performed by: INTERNAL MEDICINE

## 2025-02-18 PROCEDURE — 84443 ASSAY THYROID STIM HORMONE: CPT | Performed by: INTERNAL MEDICINE

## 2025-02-18 NOTE — PROGRESS NOTES
"   Specialty Pharmacy Patient Management Program  Endocrinology Reassessment     Inocencio Hicks was referred by an Endocrinology provider to the Endocrinology Patient Management program offered by [facility] for Type 2 Diabetes. A follow-up outreach was conducted, including assessment of continued therapy appropriateness, medication adherence, and side effect incidence and management for Mounjaro, Toujeo, and Xigduo.    Changes to Insurance Coverage or Financial Support  No changes.     Relevant Past Medical History and Comorbidities  Relevant medical history and concomitant health conditions were discussed with the patient. The patient's chart has been reviewed for relevant past medical history and comorbid health conditions and updated as necessary.   Past Medical History:   Diagnosis Date    Diabetes mellitus     Hyperlipidemia     Hypertension     Type 2 diabetes mellitus      Social History     Socioeconomic History    Marital status:    Tobacco Use    Smoking status: Former     Current packs/day: 0.00     Types: Cigarettes     Quit date: 2010     Years since quittin.2    Smokeless tobacco: Never   Vaping Use    Vaping status: Never Used   Substance and Sexual Activity    Alcohol use: Yes     Comment: rare    Drug use: Not Currently    Sexual activity: Defer     Problem list reviewed by Nanda Dixon PharmD on 2025 at 11:07 AM    Hospitalizations and Urgent Care Since Last Assessment  ED Visits, Admissions, or Hospitalizations: None.  Urgent Office Visits: None.    Allergies  Known allergies and reactions were discussed with the patient. The patient's chart has been reviewed for allergy information and updated as necessary.   Allergies   Allergen Reactions    Byetta 10 Mcg Pen [Exenatide] Nausea Only     Reports with Byetta and Bydureon     Victoza [Liraglutide] Nausea And Vomiting     \"sour stomach\"     Allergies reviewed by Nanda Dixon PharmD on 2025 " at 11:07 AM    Relevant Laboratory Values  Relevant laboratory values were discussed with the patient. The following specialty medication dose adjustment(s) are recommended: No changes.   A1C Last 3 Results          4/17/2024    15:29 7/19/2024    14:32 2/18/2025    10:47   HGBA1C Last 3 Results   Hemoglobin A1C 5.5  5.9  6.1      Lab Results   Component Value Date    HGBA1C 6.1 (A) 02/18/2025     Lab Results   Component Value Date    GLUCOSE 80 06/21/2023    CALCIUM 9.0 06/21/2023     06/21/2023    K 4.1 06/21/2023    CO2 26.0 06/21/2023     06/21/2023    BUN 13 06/21/2023    CREATININE 0.79 06/21/2023    BCR 16.5 06/21/2023    ANIONGAP 8.0 06/21/2023     Lab Results   Component Value Date    CHOL 133 06/21/2023    TRIG 54 06/21/2023    HDL 34 (L) 06/21/2023    LDL 87 06/21/2023       Current Medication List  This medication list has been reviewed with the patient and evaluated for any interactions or necessary modifications/recommendations, and updated to include all prescription medications, OTC medications, and supplements the patient is currently taking.  This list reflects what is contained in the patient's profile, which has also been marked as reviewed to communicate to other providers it is the most up to date version of the patient's current medication therapy.     Current Outpatient Medications:     albuterol sulfate  (90 Base) MCG/ACT inhaler, Inhale 2 puffs As Needed., Disp: , Rfl:     Aspirin 81 MG capsule, Take  by mouth., Disp: , Rfl:     celecoxib (CeleBREX) 200 MG capsule, Take 1 capsule by mouth Daily., Disp: , Rfl:     Continuous Glucose Sensor (FreeStyle Gloria 3 Plus Sensor), Use 1 sensor every 15 days., Disp: 6 each, Rfl: 3    dapagliflozin-metformin HCl ER (Xigduo XR) 5-1000 MG tablet, Take 2 tablets by mouth Daily., Disp: 180 tablet, Rfl: 3    DULoxetine (CYMBALTA) 60 MG capsule, Take 1 capsule by mouth Daily., Disp: , Rfl:     glucose blood (FreeStyle Precision Derrek Test)  test strip, Use with Gloria 2 Fitz[place]ugh to Test Glucose When Needed (if Sensor is Not Working), Disp: 100 each, Rfl: 3    Insulin Glargine, 2 Unit Dial, (Toujeo Sam SoloStar) 300 UNIT/ML solution pen-injector injection, Inject 50 Units under the skin into the appropriate area as directed Daily., Disp: 18 mL, Rfl: 3    Insulin Pen Needle (B-D UF III MINI PEN NEEDLES) 31G X 5 MM misc, Use as directed for insulin admininstration, Disp: 100 each, Rfl: 3    lisinopril (PRINIVIL,ZESTRIL) 20 MG tablet, Take 2 tablets by mouth Daily. Pt taking #2 20mg tablets once daily for 40mg daily dose, Disp: , Rfl:     omeprazole (priLOSEC) 20 MG capsule, Take 2 capsules by mouth Daily., Disp: , Rfl:     simvastatin (ZOCOR) 20 MG tablet, Take 20 mg by mouth., Disp: , Rfl:     SUMAtriptan (IMITREX) 100 MG tablet, Take 1 tablet by mouth As Needed., Disp: , Rfl:     Tirzepatide 10 MG/0.5ML solution auto-injector, Inject 0.5 mL under the skin into the appropriate area as directed 1 (One) Time Per Week., Disp: 2 mL, Rfl: 3    vitamin D (ERGOCALCIFEROL) 1.25 MG (29157 UT) capsule capsule, Take 1 capsule by mouth 1 (One) Time Per Week., Disp: , Rfl:     Medicines reviewed by Nanda Dixon, PharmD on 2/18/2025 at 11:07 AM    Drug Interactions  No Clinically Significant DDIs Were Identified at Present Time Upon Marking Medications Reviewed      Recommended Medications Assessment  Aspirin: Currently Taking   Statin: Currently Taking   ACEi/ARB: Currently Taking     Adverse Drug Reactions  Medication tolerability: Tolerating with no to minimal ADRs  Medication plan: Continue therapy with normal follow-up  Plan for ADR Management: None    Adherence, Self-Administration, and Current Therapy Problems  Adherence related to the patient's specialty therapy was discussed with the patient. The Adherence segment of this outreach has been reviewed and updated.     Adherence Questions  Linked Medication(s) Assessed: Dapagliflozin Prop-metFORMIN (XIGDUO XR),  Insulin Glargine (Toujeo Max SoloStar), Tirzepatide  On average, how many doses/injections does the patient miss per month?: 0  What are the identified reasons for non-adherence or missed doses? : no problems identified  What is the estimated medication adherence level?: % (Xigduo = 98%, Toujeo 100%, Mounjaro 56% (no misses since October))  Based on the patient/caregiver response and refill history, does this patient require an MTP to track adherence improvements?: no    Additional Barriers to Patient Self-Administration: None.  Methods for Supporting Patient Self-Administration: None.    Open Medication Therapy Problems  No medication therapy recommendations to display    Goals of Therapy  Goals related to the patient's specialty therapy were discussed with the patient. The Patient Goals segment of this outreach has been reviewed and updated.   Goals Addressed Today        Specialty Pharmacy General Goal      Clinical Target: Maintain A1c 6-7%      Lab Results    Component Value Date     HGBA1c  6.1% 2/18/25 A1c well controlled. No changes. MS    Virtual  01/08/2025 Patient tolerating medications well.  No side effects or issues reported.  Will tyrese on track and follow up at next appointment. RS    HGBA1c  5.9% 7/19/2024 A1c 5.9%. Decrease Toujeo by 4 units at bedtime to prevent hypoglycemia.     HGBA1C 5.5 04/17/2024 A1c increased to 5.5% from 4.9% during previous visit.  Still on track with A1c goal of <7%.  Will continue to follow and monitor.  CR    HGBA1C 4.9 11/14/2023 A1c 4.9% last month.  On track.  CR    HGBA1C 5.9 06/21/2023 5.9% - Unchanged from previous 2 visits.  Increasing Mounjaro to 10mg weekly.  On track.  CR    HGBA1C 5.9 01/04/2023     HGBA1C 5.9 07/19/2022                    Quality of Life Assessment   Quality of Life related to the patient's enrollment in the patient management program and services provided was discussed with the patient. The QOL segment of this outreach has been  reviewed and updated.  Quality of Life Improvement Scale: 9-A good deal better    Reassessment Plan & Follow-Up  1. Medication Therapy Changes: No changes.   2. Related Plans, Therapy Recommendations, or Issues to Be Addressed: None.  3. Pharmacist to perform regular assessments no more than (6) months from the previous assessment.  4. Care Coordinator to set up future refill outreaches, coordinate prescription delivery, and escalate clinical questions to pharmacist.    Attestation  Therapeutic appropriateness: Appropriate   I attest the patient was actively involved in and has agreed to the above plan of care.  If the prescribed therapy is at any point deemed not appropriate based on the current or future assessments, a consultation will be initiated with the patient's specialty care provider to determine the best course of action. The revised plan of therapy will be documented along with any required assessments and/or additional patient education provided.     Nanda Dixon, PharmD, BCCCP, BCPS  Clinical Specialty Pharmacist, Endocrinology  2/18/2025  11:08 EST    Discussed the aforementioned information with the patient via In-Person.

## 2025-02-18 NOTE — PROGRESS NOTES
"     Office Note      Date: 2025  Patient Name: Inocencio Hicks  MRN: [id number]  : 1980    Chief Complaint   Patient presents with    Diabetes     Type 2 diabetes mellitus with hyperglycemia, with long-term current use of insulin    Hypertension    Hyperlipidemia       History of Present Illness:   Inocencio Hicks is a 45 y.o. male who presents for Diabetes type 2. Diagnosed in: . Treated in past with oral agents. Current treatments: farxiga, metformin, mounjaro and basal insulin. Number of insulin shots per day: 1. Checks blood sugar 288x per day - on FreeStyle Gloria 3. Has low blood sugar: occasional. Aspirin use: Yes. Statin use: Yes. ACE-I/ARB use: Yes. Changes in health since last visit: none. Last eye exam 2024.    Subjective      Diabetic Complications:  Eyes: No  Kidneys: No  Feet: Yes - on cymbalta  Heart: No    Diet and Exercise:  Meals per day: 3  Minutes of exercise per week: 0 mins.    Review of Systems:   Review of Systems   Constitutional: Negative.    Cardiovascular: Negative.    Gastrointestinal: Negative.    Endocrine: Negative.        The following portions of the patient's history were reviewed and updated as appropriate: allergies, current medications, past family history, past medical history, past social history, past surgical history, and problem list.    Objective     Visit Vitals  /62 (BP Location: Left arm, Patient Position: Sitting, Cuff Size: Adult)   Pulse 80   Ht 182.9 cm (72\")   Wt 121 kg (266 lb)   SpO2 99%   BMI 36.08 kg/m²       Physical Exam:  Physical Exam  Constitutional:       Appearance: Normal appearance.   Cardiovascular:      Pulses:           Dorsalis pedis pulses are 2+ on the right side and 2+ on the left side.        Posterior tibial pulses are 2+ on the right side and 2+ on the left side.   Feet:      Right foot:      Protective Sensation: 5 sites tested.  5 sites sensed.      Skin integrity: Skin integrity normal.      Toenail Condition: " "Right toenails are abnormally thick. Fungal disease present.     Left foot:      Protective Sensation: 5 sites tested.  5 sites sensed.      Skin integrity: Skin integrity normal.      Toenail Condition: Left toenails are abnormally thick. Fungal disease present.  Neurological:      Mental Status: He is alert.         Labs:    HbA1c  Lab Results   Component Value Date    HGBA1C 6.1 (A) 02/18/2025       CMP  Lab Results   Component Value Date    GLUCOSE 80 06/21/2023    BUN 13 06/21/2023    CREATININE 0.79 06/21/2023    BCR 16.5 06/21/2023    K 4.1 06/21/2023    CO2 26.0 06/21/2023    CALCIUM 9.0 06/21/2023    AST 17 06/21/2023    ALT 21 06/21/2023        Lipid Panel  Lab Results   Component Value Date    HDL Cholesterol 34 (L) 06/21/2023    LDL Cholesterol  87 06/21/2023    LDL/HDL Ratio 2.59 06/21/2023    Triglycerides 54 06/21/2023        TSH  Lab Results   Component Value Date    TSH 0.631 06/21/2023        Hemoglobin A1C  No components found for: \"HGBA1C\"     Microalbumin/Creatinine  Lab Results   Component Value Date    MALBCRERATIO  06/21/2023      Comment:      Unable to calculate    MICROALBUR <1.2 06/21/2023           Assessment / Plan      Assessment & Plan:  Diagnoses and all orders for this visit:    1. Type 2 diabetes mellitus with hyperglycemia, with long-term current use of insulin (Primary)  Assessment & Plan:  Diabetes is stable.   Continue current treatment regimen.  Diabetes will be reassessed in 6 months.    FreeStyle Gloria 3 CGM was downloaded today.  Data was reviewed from 2/5/25 to 2/18/25.  This showed fairly good overall glucose control.  Rare fasting hypoglycemia.  Occ postprandial spike.  No patterns for medication adjustments.  Time in range was 92%.    Orders:  -     POC Glucose, Blood  -     POC Glycosylated Hemoglobin (Hb A1C)  -     Comprehensive Metabolic Panel; Future  -     Lipid Panel; Future  -     Microalbumin / Creatinine Urine Ratio - Urine, Clean Catch; Future  -     TSH; " Future    2. Benign hypertension  Assessment & Plan:  Hypertension is stable and controlled.  Continue current treatment regimen.  Blood pressure will be reassessed in 6 months.      3. Mixed hyperlipidemia  Assessment & Plan:  Continue statin.  Check lipids.      4. Type 2 diabetes mellitus with diabetic polyneuropathy, with long-term current use of insulin  Assessment & Plan:  Continue cymbalta.  Foot exam okay today.        Current Outpatient Medications   Medication Instructions    albuterol sulfate  (90 Base) MCG/ACT inhaler 2 puffs, As Needed    Aspirin 81 MG capsule Take  by mouth.    celecoxib (CELEBREX) 200 mg, Daily    Continuous Glucose Sensor (FreeStyle Gloria 3 Plus Sensor) Use 1 sensor every 15 days.    dapagliflozin-metformin HCl ER (Xigduo XR) 5-1000 MG tablet 2 tablets, Oral, Daily    DULoxetine (CYMBALTA) 60 mg, Daily    glucose blood (FreeStyle Precision Derrek Test) test strip Use with Gloria 2 Foley to Test Glucose When Needed (if Sensor is Not Working)    Insulin Pen Needle (B-D UF III MINI PEN NEEDLES) 31G X 5 MM misc Use as directed for insulin admininstration    lisinopril (PRINIVIL,ZESTRIL) 40 mg, Daily    omeprazole (PRILOSEC) 40 mg, Daily    simvastatin (ZOCOR) 20 mg, Oral    SUMAtriptan (IMITREX) 100 mg, As Needed    Tirzepatide 10 MG/0.5ML solution auto-injector 0.5 mL, Subcutaneous, Weekly    Toujeo Max SoloStar 50 Units, Subcutaneous, Daily    vitamin D (ERGOCALCIFEROL) 50,000 Units, Weekly      Return in about 6 months (around 8/18/2025) for Recheck with A1c.    Electronically signed by: Brice Moura MD  02/18/2025

## 2025-02-18 NOTE — ASSESSMENT & PLAN NOTE
Diabetes is stable.   Continue current treatment regimen.  Diabetes will be reassessed in 6 months.    FreeStyle Gloria 3 CGM was downloaded today.  Data was reviewed from 2/5/25 to 2/18/25.  This showed fairly good overall glucose control.  Rare fasting hypoglycemia.  Occ postprandial spike.  No patterns for medication adjustments.  Time in range was 92%.

## 2025-02-19 LAB
ALBUMIN UR-MCNC: <1.2 MG/DL
CREAT UR-MCNC: 102 MG/DL
MICROALBUMIN/CREAT UR: NORMAL MG/G{CREAT}

## 2025-02-20 ENCOUNTER — SPECIALTY PHARMACY (OUTPATIENT)
Dept: ENDOCRINOLOGY | Facility: CLINIC | Age: 45
End: 2025-02-20
Payer: COMMERCIAL

## 2025-02-20 NOTE — PROGRESS NOTES
Specialty Pharmacy Patient Management Program  Endocrinology Refill Outreach      Inocencio is a 45 y.o. male contacted today regarding refills of his medication(s).    Specialty medication(s) and dose(s) confirmed: Mounjaro    Refill Questions      Flowsheet Row Most Recent Value   Changes to allergies? No   Changes to medications? No   New conditions or infections since last clinic visit No   Unplanned office visit, urgent care, ED, or hospital admission in the last 4 weeks  No   How does patient/caregiver feel medication is working? Very good   Financial problems or insurance changes  No   Since the previous refill, were any specialty medication doses or scheduled injections missed or delayed?  No   Does this patient require a clinical escalation to a pharmacist? No          Delivery Questions      Flowsheet Row Most Recent Value   Delivery method UPS   Delivery address verified with patient/caregiver? Yes   Delivery address Home   Number of medications in delivery 1   Medication(s) being filled and delivered Tirzepatide (MOUNJARO)   Copay verified? Yes   Copay amount 25   Copay form of payment Credit/debit on file   Delivery Date Selection 02/21/25   Signature Required No            Follow-Up: 28d    Nanda Dixon, Pharm.D. BCPS, BCCCP  Clinical Specialty Pharmacist, Endocrinology   08:17 EST  02/20/25

## 2025-03-06 ENCOUNTER — SPECIALTY PHARMACY (OUTPATIENT)
Dept: ENDOCRINOLOGY | Facility: CLINIC | Age: 45
End: 2025-03-06
Payer: COMMERCIAL

## 2025-03-06 LAB
GFR SERPL CREATININE-BSD FRML MDRD: 108 ML/MIN/1.73M*2 (ref 60–?)
HBA1C MFR BLD: 6.4 %

## 2025-03-06 NOTE — PROGRESS NOTES
Specialty Pharmacy Patient Management Program  Endocrinology Refill Outreach      Inocencio is a 45 y.o. male contacted today regarding refills of his medication(s).    Specialty medication(s) and dose(s) confirmed: Xigduo    Refill Questions      Flowsheet Row Most Recent Value   Changes to allergies? No   Changes to medications? No   New conditions or infections since last clinic visit No   Unplanned office visit, urgent care, ED, or hospital admission in the last 4 weeks  No   How does patient/caregiver feel medication is working? Very good   Financial problems or insurance changes  No   Since the previous refill, were any specialty medication doses or scheduled injections missed or delayed?  No   Does this patient require a clinical escalation to a pharmacist? No          Delivery Questions      Flowsheet Row Most Recent Value   Delivery method UPS   Delivery address verified with patient/caregiver? Yes   Delivery address Home   Number of medications in delivery 1   Medication(s) being filled and delivered Dapagliflozin Prop-metFORMIN (XIGDUO XR)   Copay verified? Yes   Copay amount 0   Copay form of payment No copayment ($0)   Delivery Date Selection 03/07/25   Signature Required No            Follow-Up: 30d    Nanda Dixon, Pharm.D. BCPS, BCCCP  Clinical Specialty Pharmacist, Endocrinology   12:56 EST  03/06/25

## 2025-04-03 ENCOUNTER — TELEPHONE (OUTPATIENT)
Dept: ENDOCRINOLOGY | Facility: CLINIC | Age: 45
End: 2025-04-03
Payer: COMMERCIAL

## 2025-04-03 ENCOUNTER — SPECIALTY PHARMACY (OUTPATIENT)
Age: 45
End: 2025-04-03
Payer: COMMERCIAL

## 2025-04-03 RX ORDER — FLURBIPROFEN SODIUM 0.3 MG/ML
SOLUTION/ DROPS OPHTHALMIC
Qty: 100 EACH | Refills: 3 | Status: SHIPPED | OUTPATIENT
Start: 2025-04-03

## 2025-04-03 NOTE — TELEPHONE ENCOUNTER
Specialty Pharmacy Patient Management Program  Per Protocol Prescription Order/Refill     Patient currently fills medications at [facility] and is enrolled in an Endocrinology Patient Management Program.     Requested Prescriptions     Pending Prescriptions Disp Refills    Insulin Pen Needle (B-D UF III MINI PEN NEEDLES) 31G X 5 MM misc 100 each 3     Sig: Use as directed for insulin admininstration     Prescription orders above were sent to the pharmacy per Collaborative Care Agreement Protocol.     Patient needing refills, sent via [facility]      Leander [email or web address].  Clinical Specialty Pharmacist, Endocrinology  08:38 EDT 04/03/25

## 2025-04-03 NOTE — PROGRESS NOTES
Specialty Pharmacy Patient Management Program  Endocrinology Refill Outreach      Inocencio is a 45 y.o. male contacted today regarding refills of his medication(s).    Specialty medication(s) and dose(s) confirmed: Xigduo, Mounjaro.    Refill Questions      Flowsheet Row Most Recent Value   Changes to allergies? No   Changes to medications? No   New conditions or infections since last clinic visit No   Unplanned office visit, urgent care, ED, or hospital admission in the last 4 weeks  No   How does patient/caregiver feel medication is working? Very good   Financial problems or insurance changes  No   Since the previous refill, were any specialty medication doses or scheduled injections missed or delayed?  No   Does this patient require a clinical escalation to a pharmacist? No          Delivery Questions      Flowsheet Row Most Recent Value   Delivery method UPS   Delivery address verified with patient/caregiver? Yes   Delivery address Home   Number of medications in delivery 3   Medication(s) being filled and delivered Insulin Pen Needle (B-D UF III MINI PEN NEEDLES), Dapagliflozin Prop-metFORMIN (XIGDUO XR), Tirzepatide   Doses left of specialty medications 1   Copay verified? Yes   Copay amount $25   Copay form of payment HSA/FSA on file   Delivery Date Selection 04/04/25   Signature Required No   Do you consent to receive electronic handouts?  Yes            Follow-Up: 28d    Xu Ellis CPhT  Pharmacy Care Coordinator, Endocrinology  4/3/2025  08:39 EDT

## 2025-04-22 ENCOUNTER — SPECIALTY PHARMACY (OUTPATIENT)
Dept: ENDOCRINOLOGY | Facility: CLINIC | Age: 45
End: 2025-04-22
Payer: COMMERCIAL

## 2025-04-22 NOTE — PROGRESS NOTES
Specialty Pharmacy Patient Management Program  Endocrinology Refill Outreach      Inocencio is a 45 y.o. male contacted today regarding refills of his medication(s).    Specialty medication(s) and dose(s) confirmed: toujeo    Refill Questions      Flowsheet Row Most Recent Value   Changes to allergies? No   Changes to medications? No   New conditions or infections since last clinic visit No   Unplanned office visit, urgent care, ED, or hospital admission in the last 4 weeks  No   How does patient/caregiver feel medication is working? Very good   Financial problems or insurance changes  No   Since the previous refill, were any specialty medication doses or scheduled injections missed or delayed?  No   Does this patient require a clinical escalation to a pharmacist? No          Delivery Questions      Flowsheet Row Most Recent Value   Delivery method UPS   Delivery address verified with patient/caregiver? Yes   Delivery address Home   Number of medications in delivery 1   Medication(s) being filled and delivered Insulin Glargine (Toujeo Max SoloStar)   Doses left of specialty medications 3   Copay verified? Yes   Copay amount $10   Copay form of payment HSA/FSA on file   Delivery Date Selection 04/23/25   Signature Required No            Follow-Up: 7/3/25    Vilma Salgado, PharmD  Clinical Specialty Pharmacist, Endocrinology    4/22/2025  08:48 EDT

## 2025-04-22 NOTE — PROGRESS NOTES
Specialty Pharmacy Patient Management Program  Refill Outreach     Inocencio was contacted today regarding refills of their medication(s). Toujeo.    Refill Questions      Flowsheet Row Most Recent Value   Changes to allergies? No   Changes to medications? No   New conditions or infections since last clinic visit No   Unplanned office visit, urgent care, ED, or hospital admission in the last 4 weeks  No   How does patient/caregiver feel medication is working? Good   Financial problems or insurance changes  No   Since the previous refill, were any specialty medication doses or scheduled injections missed or delayed?  No   Does this patient require a clinical escalation to a pharmacist? No            Delivery Questions      Flowsheet Row Most Recent Value   Delivery method UPS   Delivery address verified with patient/caregiver? Yes   Delivery address Home   Number of medications in delivery 1   Medication(s) being filled and delivered Insulin Glargine (Toujeo Max SoloStar)   Doses left of specialty medications 1   Copay verified? Yes   Copay amount $10.00   Copay form of payment Credit/debit on file   Delivery Date Selection 04/23/25   Signature Required No   Do you consent to receive electronic handouts?  Yes                 Follow-up: 72 day(s)     Rossana Marquez, Pharmacy Technician  4/22/2025  11:39 EDT

## 2025-04-30 ENCOUNTER — SPECIALTY PHARMACY (OUTPATIENT)
Dept: ENDOCRINOLOGY | Facility: CLINIC | Age: 45
End: 2025-04-30
Payer: COMMERCIAL

## 2025-04-30 NOTE — PROGRESS NOTES
Specialty Pharmacy Patient Management Program  Endocrinology Refill Outreach      Inocencio is a 45 y.o. male contacted today regarding refills of his medication(s).    Specialty medication(s) and dose(s) confirmed: Mounjaro and Xigduo    Refill Questions      Flowsheet Row Most Recent Value   Changes to allergies? No   Changes to medications? No   New conditions or infections since last clinic visit No   Unplanned office visit, urgent care, ED, or hospital admission in the last 4 weeks  No   [colonoscopy this past month, 4/23, one dose Mounjaro held]   How does patient/caregiver feel medication is working? Very good   Financial problems or insurance changes  No   Since the previous refill, were any specialty medication doses or scheduled injections missed or delayed?  No          Delivery Questions      Flowsheet Row Most Recent Value   Delivery method UPS   Delivery address Home   Number of medications in delivery 2   Medication(s) being filled and delivered Dapagliflozin Prop-metFORMIN (XIGDUO XR), Tirzepatide   Doses left of specialty medications 1   Copay verified? Yes   Copay amount $25   Copay form of payment Credit/debit on file   Delivery Date Selection 05/02/25   Signature Required No            Follow-Up: 28 days    Vilma Salgado, PharmD  Clinical Specialty Pharmacist, Endocrinology    4/30/2025  14:17 EDT

## 2025-05-27 ENCOUNTER — SPECIALTY PHARMACY (OUTPATIENT)
Dept: ENDOCRINOLOGY | Facility: CLINIC | Age: 45
End: 2025-05-27
Payer: COMMERCIAL

## 2025-05-27 NOTE — PROGRESS NOTES
Specialty Pharmacy Patient Management Program  Refill Outreach     Inocencio was contacted today regarding refills of their medication(s). Mounjaro     Refill Questions      Flowsheet Row Most Recent Value   Changes to allergies? No   Changes to medications? No   New conditions or infections since last clinic visit No   Unplanned office visit, urgent care, ED, or hospital admission in the last 4 weeks  No   How does patient/caregiver feel medication is working? Good   Financial problems or insurance changes  No   Since the previous refill, were any specialty medication doses or scheduled injections missed or delayed?  No   Does this patient require a clinical escalation to a pharmacist? No            Delivery Questions      Flowsheet Row Most Recent Value   Delivery method UPS   Delivery address verified with patient/caregiver? Yes   Delivery address Home   Number of medications in delivery 2   Medication(s) being filled and delivered Tirzepatide, Continuous Glucose Sensor (FreeStyle Gloria 3 Plus Sensor)   Doses left of specialty medications 1   Copay verified? Yes   Copay amount $25.00   Copay form of payment Credit/debit on file   Delivery Date Selection 05/29/25   Signature Required No   Do you consent to receive electronic handouts?  Yes                 Follow-up: 28 day(s)     Rossana Marquez, Pharmacy Technician  5/27/2025  15:33 EDT

## 2025-06-09 ENCOUNTER — SPECIALTY PHARMACY (OUTPATIENT)
Dept: ENDOCRINOLOGY | Facility: CLINIC | Age: 45
End: 2025-06-09
Payer: COMMERCIAL

## 2025-06-09 NOTE — PROGRESS NOTES
Specialty Pharmacy Patient Management Program  Refill Outreach     Inocencio was contacted today regarding refills of their medication(s). Xigduo    Refill Questions      Flowsheet Row Most Recent Value   Changes to allergies? No   Changes to medications? No   New conditions or infections since last clinic visit No   Unplanned office visit, urgent care, ED, or hospital admission in the last 4 weeks  No   How does patient/caregiver feel medication is working? Good   Financial problems or insurance changes  No   Since the previous refill, were any specialty medication doses or scheduled injections missed or delayed?  No   Does this patient require a clinical escalation to a pharmacist? No            Delivery Questions      Flowsheet Row Most Recent Value   Delivery method UPS   Delivery address verified with patient/caregiver? Yes   Delivery address Home   Number of medications in delivery 1   Medication(s) being filled and delivered Dapagliflozin Prop-metFORMIN (XIGDUO XR)   Doses left of specialty medications 1   Copay verified? Yes   Copay amount $0.00   Copay form of payment No copayment ($0)   Delivery Date Selection 06/10/25   Signature Required No   Do you consent to receive electronic handouts?  Yes                 Follow-up: 30 day(s)     Rossana Marquez, Pharmacy Technician  6/9/2025  13:19 EDT

## 2025-06-25 ENCOUNTER — SPECIALTY PHARMACY (OUTPATIENT)
Dept: ENDOCRINOLOGY | Facility: CLINIC | Age: 45
End: 2025-06-25
Payer: COMMERCIAL

## 2025-06-25 ENCOUNTER — TELEPHONE (OUTPATIENT)
Dept: GENERAL RADIOLOGY | Facility: HOSPITAL | Age: 45
End: 2025-06-25
Payer: COMMERCIAL

## 2025-06-25 NOTE — TELEPHONE ENCOUNTER
Specialty Pharmacy Patient Management Program  Per Protocol Prescription Order/Refill     Patient currently fills medications at [facility] and is enrolled in an Endocrinology Patient Management Program.     Requested Prescriptions     Pending Prescriptions Disp Refills    Tirzepatide 10 MG/0.5ML solution auto-injector 2 mL 3     Sig: Inject 0.5 mL under the skin into the appropriate area as directed 1 (One) Time Per Week.     Prescription orders above were sent to the pharmacy per Collaborative Care Agreement Protocol.     Patient needing refills.  Sent via [facility].      Leander Tony Pharm.D.  Clinical Specialty Pharmacist, Endocrinology  15:14 EDT 06/25/25

## 2025-06-25 NOTE — PROGRESS NOTES
Specialty Pharmacy Patient Management Program  Endocrinology Refill Outreach      Inocencio is a 45 y.o. male contacted today regarding refills of his medication(s).    Specialty medication(s) and dose(s) confirmed: Toujeo Max   Mounjaro    Refill Questions      Flowsheet Row Most Recent Value   Changes to allergies? No   Changes to medications? No   New conditions or infections since last clinic visit No   Unplanned office visit, urgent care, ED, or hospital admission in the last 4 weeks  No   How does patient/caregiver feel medication is working? Very good   Financial problems or insurance changes  No   Since the previous refill, were any specialty medication doses or scheduled injections missed or delayed?  No   Does this patient require a clinical escalation to a pharmacist? No          Delivery Questions      Flowsheet Row Most Recent Value   Delivery method UPS   Delivery address verified with patient/caregiver? Yes   Delivery address Home   Number of medications in delivery 2   Medication(s) being filled and delivered Insulin Glargine (Toujeo Max SoloStar), Insulin Pen Needle (B-D UF III MINI PEN NEEDLES)   Doses left of specialty medications 0   Copay verified? Yes   Copay amount $10   Copay form of payment Credit/debit on file   Delivery Date Selection 07/01/25   Signature Required No        Awaiting Prior Authorization for his Mounjaro.  Coupon card applied / added to his profile.      Follow-Up: 28 days.    Vilma Salgado, PharmD, LDE, CPT  Clinical Specialty Pharmacist, Endocrinology  6/30/2025  11:53 EDT

## 2025-06-26 ENCOUNTER — SPECIALTY PHARMACY (OUTPATIENT)
Dept: ENDOCRINOLOGY | Facility: CLINIC | Age: 45
End: 2025-06-26
Payer: COMMERCIAL

## 2025-06-26 NOTE — PROGRESS NOTES
Specialty Pharmacy Patient Management Program  Prior Authorization Denial     Prior Authorization for Mounjaro was Denied    Approval Start Date:    Approval End Date:    Authorization / Reference / Case Number: [id number]    Sent appeal on 6/26/2025  awaiting reply    Will need to refill Mounjaro once approval received       Vilma Salgado, Jennyfer, LDE, CPT  Clinical Specialty Pharmacist, Endocrinology  6/26/2025  12:51 EDT

## 2025-06-30 ENCOUNTER — SPECIALTY PHARMACY (OUTPATIENT)
Dept: ENDOCRINOLOGY | Facility: CLINIC | Age: 45
End: 2025-06-30
Payer: COMMERCIAL

## 2025-07-01 ENCOUNTER — SPECIALTY PHARMACY (OUTPATIENT)
Dept: ENDOCRINOLOGY | Facility: CLINIC | Age: 45
End: 2025-07-01
Payer: COMMERCIAL

## 2025-07-01 NOTE — PROGRESS NOTES
Specialty Pharmacy Patient Management Program  Refill Outreach     Inocencio was contacted today regarding refills of their medication(s). Mounjaro     Refill Questions      Flowsheet Row Most Recent Value   Changes to allergies? No   Changes to medications? No   New conditions or infections since last clinic visit No   Unplanned office visit, urgent care, ED, or hospital admission in the last 4 weeks  No   How does patient/caregiver feel medication is working? Good   Financial problems or insurance changes  No   Since the previous refill, were any specialty medication doses or scheduled injections missed or delayed?  No   Does this patient require a clinical escalation to a pharmacist? No            Delivery Questions      Flowsheet Row Most Recent Value   Delivery method UPS   Delivery address verified with patient/caregiver? Yes   Delivery address Home   Number of medications in delivery 1   Medication(s) being filled and delivered Tirzepatide   Doses left of specialty medications 1   Copay verified? Yes   Copay amount $25.00   Copay form of payment Credit/debit on file   Delivery Date Selection 07/02/25   Signature Required No   Do you consent to receive electronic handouts?  Yes                 Follow-up: 28 day(s)     Rossana Marquez CPhT  Pharmacy Care Coordinator, Endocrinology  7/1/2025  10:03 EDT

## 2025-07-01 NOTE — PROGRESS NOTES
Specialty Pharmacy Patient Management Program  Refill Outreach     Inocencio was contacted today regarding refills of their medication(s). Xigduo    Refill Questions      Flowsheet Row Most Recent Value   Changes to allergies? No   Changes to medications? No   New conditions or infections since last clinic visit No   Unplanned office visit, urgent care, ED, or hospital admission in the last 4 weeks  No   How does patient/caregiver feel medication is working? Good   Financial problems or insurance changes  No   Since the previous refill, were any specialty medication doses or scheduled injections missed or delayed?  No   Does this patient require a clinical escalation to a pharmacist? No            Delivery Questions      Flowsheet Row Most Recent Value   Delivery method UPS   Delivery address verified with patient/caregiver? Yes   Delivery address Home   Number of medications in delivery 1   Medication(s) being filled and delivered Dapagliflozin Prop-metFORMIN (XIGDUO XR)   Doses left of specialty medications 1   Copay verified? Yes   Copay amount $0.00   Copay form of payment No copayment ($0)   Delivery Date Selection 07/02/25   Signature Required No   Do you consent to receive electronic handouts?  Yes                 Follow-up: 30 day(s)     Rossana Marquez CPhT  Pharmacy Care Coordinator, Endocrinology  7/1/2025  11:50 EDT

## 2025-07-24 ENCOUNTER — SPECIALTY PHARMACY (OUTPATIENT)
Dept: ENDOCRINOLOGY | Facility: CLINIC | Age: 45
End: 2025-07-24
Payer: COMMERCIAL

## 2025-07-24 NOTE — PROGRESS NOTES
Specialty Pharmacy Patient Management Program  Refill Outreach     Inocencio was contacted today regarding refills of their medication(s). Mounjaro    Refill Questions      Flowsheet Row Most Recent Value   Changes to allergies? No   Changes to medications? No   New conditions or infections since last clinic visit No   Unplanned office visit, urgent care, ED, or hospital admission in the last 4 weeks  No   How does patient/caregiver feel medication is working? Very good   Financial problems or insurance changes  No   Since the previous refill, were any specialty medication doses or scheduled injections missed or delayed?  No   Does this patient require a clinical escalation to a pharmacist? No            Delivery Questions      Flowsheet Row Most Recent Value   Delivery method UPS   Delivery address verified with patient/caregiver? Yes   Delivery address Home   Number of medications in delivery 1   Medication(s) being filled and delivered Tirzepatide   Doses left of specialty medications 1   Copay verified? Yes   Copay amount $25.00   Copay form of payment Credit/debit on file   Delivery Date Selection 07/29/25   Signature Required No   Do you consent to receive electronic handouts?  No                 Follow-up: 28 day(s)     Rossana Marquez CPhT  Pharmacy Care Coordinator, Endocrinology  7/24/2025  14:27 EDT

## 2025-08-11 ENCOUNTER — SPECIALTY PHARMACY (OUTPATIENT)
Dept: ENDOCRINOLOGY | Facility: CLINIC | Age: 45
End: 2025-08-11
Payer: COMMERCIAL

## 2025-08-12 ENCOUNTER — SPECIALTY PHARMACY (OUTPATIENT)
Dept: GENERAL RADIOLOGY | Facility: HOSPITAL | Age: 45
End: 2025-08-12
Payer: COMMERCIAL

## 2025-08-26 ENCOUNTER — OFFICE VISIT (OUTPATIENT)
Dept: ENDOCRINOLOGY | Facility: CLINIC | Age: 45
End: 2025-08-26
Payer: COMMERCIAL

## 2025-08-26 ENCOUNTER — TELEPHONE (OUTPATIENT)
Dept: GENERAL RADIOLOGY | Facility: HOSPITAL | Age: 45
End: 2025-08-26
Payer: COMMERCIAL

## 2025-08-26 ENCOUNTER — SPECIALTY PHARMACY (OUTPATIENT)
Dept: ENDOCRINOLOGY | Facility: CLINIC | Age: 45
End: 2025-08-26
Payer: COMMERCIAL

## 2025-08-26 VITALS
BODY MASS INDEX: 35.35 KG/M2 | HEIGHT: 72 IN | SYSTOLIC BLOOD PRESSURE: 104 MMHG | HEART RATE: 82 BPM | DIASTOLIC BLOOD PRESSURE: 66 MMHG | WEIGHT: 261 LBS

## 2025-08-26 DIAGNOSIS — E11.42 TYPE 2 DIABETES MELLITUS WITH DIABETIC POLYNEUROPATHY, WITH LONG-TERM CURRENT USE OF INSULIN: ICD-10-CM

## 2025-08-26 DIAGNOSIS — E78.2 MIXED HYPERLIPIDEMIA: ICD-10-CM

## 2025-08-26 DIAGNOSIS — Z79.4 TYPE 2 DIABETES MELLITUS WITH DIABETIC POLYNEUROPATHY, WITH LONG-TERM CURRENT USE OF INSULIN: ICD-10-CM

## 2025-08-26 DIAGNOSIS — Z79.4 TYPE 2 DIABETES MELLITUS WITH HYPERGLYCEMIA, WITH LONG-TERM CURRENT USE OF INSULIN: Primary | ICD-10-CM

## 2025-08-26 DIAGNOSIS — I10 BENIGN HYPERTENSION: ICD-10-CM

## 2025-08-26 DIAGNOSIS — E11.65 TYPE 2 DIABETES MELLITUS WITH HYPERGLYCEMIA, WITH LONG-TERM CURRENT USE OF INSULIN: Primary | ICD-10-CM

## 2025-08-26 LAB
EXPIRATION DATE: ABNORMAL
EXPIRATION DATE: NORMAL
GLUCOSE BLDC GLUCOMTR-MCNC: 93 MG/DL (ref 70–130)
HBA1C MFR BLD: 6 % (ref 4.5–5.7)
Lab: ABNORMAL
Lab: NORMAL

## 2025-08-26 RX ORDER — HYDROCHLOROTHIAZIDE 12.5 MG/1
1 CAPSULE ORAL TAKE AS DIRECTED
Qty: 6 EACH | Refills: 3 | Status: SHIPPED | OUTPATIENT
Start: 2025-08-26

## 2025-08-26 RX ORDER — MULTIPLE VITAMINS W/ MINERALS TAB 9MG-400MCG
1 TAB ORAL DAILY
COMMUNITY